# Patient Record
Sex: MALE | Race: WHITE | NOT HISPANIC OR LATINO | URBAN - METROPOLITAN AREA
[De-identification: names, ages, dates, MRNs, and addresses within clinical notes are randomized per-mention and may not be internally consistent; named-entity substitution may affect disease eponyms.]

---

## 2017-09-27 ENCOUNTER — EMERGENCY (EMERGENCY)
Facility: HOSPITAL | Age: 62
LOS: 1 days | Discharge: HOME | End: 2017-09-27
Admitting: EMERGENCY MEDICINE
Payer: MEDICARE

## 2017-09-27 VITALS
RESPIRATION RATE: 20 BRPM | HEART RATE: 108 BPM | WEIGHT: 169.98 LBS | SYSTOLIC BLOOD PRESSURE: 148 MMHG | DIASTOLIC BLOOD PRESSURE: 98 MMHG | OXYGEN SATURATION: 98 % | TEMPERATURE: 98 F

## 2017-09-27 DIAGNOSIS — Z79.899 OTHER LONG TERM (CURRENT) DRUG THERAPY: ICD-10-CM

## 2017-09-27 DIAGNOSIS — Z79.82 LONG TERM (CURRENT) USE OF ASPIRIN: ICD-10-CM
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
## 2017-09-27 DIAGNOSIS — S00.03XA CONTUSION OF SCALP, INITIAL ENCOUNTER: ICD-10-CM

## 2017-09-27 DIAGNOSIS — I25.10 ATHEROSCLEROTIC HEART DISEASE OF NATIVE CORONARY ARTERY WITHOUT ANGINA PECTORIS: ICD-10-CM
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
## 2017-09-27 DIAGNOSIS — I11.0 HYPERTENSIVE HEART DISEASE WITH HEART FAILURE: ICD-10-CM

## 2017-09-27 DIAGNOSIS — I50.9 HEART FAILURE, UNSPECIFIED: ICD-10-CM

## 2017-09-27 PROCEDURE — 70450 CT HEAD/BRAIN W/O DYE: CPT

## 2017-09-27 PROCEDURE — 93010 ELECTROCARDIOGRAM REPORT: CPT

## 2017-09-27 PROCEDURE — 71020: CPT | Mod: 26

## 2017-09-27 PROCEDURE — 70450 CT HEAD/BRAIN W/O DYE: CPT | Mod: 26

## 2017-09-27 PROCEDURE — 93005 ELECTROCARDIOGRAM TRACING: CPT

## 2017-09-27 PROCEDURE — 71046 X-RAY EXAM CHEST 2 VIEWS: CPT

## 2017-09-27 PROCEDURE — 99284 EMERGENCY DEPT VISIT MOD MDM: CPT | Mod: 25

## 2017-09-27 RX ORDER — ACETAMINOPHEN 500 MG
1000 TABLET ORAL ONCE
Qty: 0 | Refills: 0 | Status: COMPLETED | OUTPATIENT
Start: 2017-09-27 | End: 2017-09-27

## 2017-09-27 RX ADMIN — Medication 1000 MILLIGRAM(S): at 03:24

## 2017-09-27 NOTE — ED PROVIDER NOTE - CARDIAC, MLM
Normal rate, regular rhythm.  Heart sounds S1, S2.  No murmurs, rubs or gallops. Palpable PPM noted left mid axillary line
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
## 2017-09-27 NOTE — ED ADULT NURSE NOTE - PMH
CAD (coronary artery disease)    CHF (congestive heart failure)    GERD (gastroesophageal reflux disease)    HTN (hypertension)

## 2017-09-27 NOTE — ED PROVIDER NOTE - CARE PLAN
Principal Discharge DX:	Closed head injury, initial encounter  Secondary Diagnosis:	CHF (congestive heart failure)  Secondary Diagnosis:	CAD (coronary artery disease)

## 2017-09-27 NOTE — ED PROVIDER NOTE - PHYSICAL EXAMINATION
small closed tender head injury/ hematoma left side occiput no bleeding   no other overt skin injuries appreciated + MS 5/5 gait steady MS 5/5
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
## 2017-10-03 ENCOUNTER — EMERGENCY (EMERGENCY)
Facility: HOSPITAL | Age: 62
LOS: 1 days | Discharge: HOME | End: 2017-10-03
Attending: EMERGENCY MEDICINE | Admitting: EMERGENCY MEDICINE
Payer: MEDICARE

## 2017-10-03 VITALS
SYSTOLIC BLOOD PRESSURE: 130 MMHG | TEMPERATURE: 98 F | OXYGEN SATURATION: 98 % | RESPIRATION RATE: 18 BRPM | DIASTOLIC BLOOD PRESSURE: 83 MMHG | WEIGHT: 169.98 LBS | HEART RATE: 97 BPM

## 2017-10-03 VITALS
DIASTOLIC BLOOD PRESSURE: 75 MMHG | RESPIRATION RATE: 18 BRPM | OXYGEN SATURATION: 98 % | HEART RATE: 89 BPM | TEMPERATURE: 98 F | SYSTOLIC BLOOD PRESSURE: 130 MMHG

## 2017-10-03 DIAGNOSIS — F17.200 NICOTINE DEPENDENCE, UNSPECIFIED, UNCOMPLICATED: ICD-10-CM

## 2017-10-03 DIAGNOSIS — R41.82 ALTERED MENTAL STATUS, UNSPECIFIED: ICD-10-CM

## 2017-10-03 DIAGNOSIS — I11.0 HYPERTENSIVE HEART DISEASE WITH HEART FAILURE: ICD-10-CM

## 2017-10-03 DIAGNOSIS — Z79.82 LONG TERM (CURRENT) USE OF ASPIRIN: ICD-10-CM

## 2017-10-03 DIAGNOSIS — I25.10 ATHEROSCLEROTIC HEART DISEASE OF NATIVE CORONARY ARTERY WITHOUT ANGINA PECTORIS: ICD-10-CM

## 2017-10-03 DIAGNOSIS — Z79.899 OTHER LONG TERM (CURRENT) DRUG THERAPY: ICD-10-CM

## 2017-10-03 DIAGNOSIS — I50.9 HEART FAILURE, UNSPECIFIED: ICD-10-CM

## 2017-10-03 DIAGNOSIS — R51 HEADACHE: ICD-10-CM

## 2017-10-03 LAB
ALBUMIN SERPL ELPH-MCNC: 3.8 G/DL — SIGNIFICANT CHANGE UP (ref 3.3–5)
ALP SERPL-CCNC: 61 U/L — SIGNIFICANT CHANGE UP (ref 40–120)
ALT FLD-CCNC: 40 U/L — SIGNIFICANT CHANGE UP (ref 10–45)
AMPHET UR-MCNC: NEGATIVE — SIGNIFICANT CHANGE UP
ANION GAP SERPL CALC-SCNC: 12 MMOL/L — SIGNIFICANT CHANGE UP (ref 5–17)
ANION GAP SERPL CALC-SCNC: 17 MMOL/L — SIGNIFICANT CHANGE UP (ref 5–17)
APAP SERPL-MCNC: <15 UG/ML — SIGNIFICANT CHANGE UP (ref 10–30)
APPEARANCE UR: CLEAR — SIGNIFICANT CHANGE UP
AST SERPL-CCNC: 40 U/L — SIGNIFICANT CHANGE UP (ref 10–40)
BACTERIA # UR AUTO: PRESENT /HPF
BARBITURATES UR SCN-MCNC: NEGATIVE — SIGNIFICANT CHANGE UP
BASOPHILS NFR BLD AUTO: 0.3 % — SIGNIFICANT CHANGE UP (ref 0–2)
BENZODIAZ UR-MCNC: NEGATIVE — SIGNIFICANT CHANGE UP
BILIRUB SERPL-MCNC: 1.7 MG/DL — HIGH (ref 0.2–1.2)
BILIRUB UR-MCNC: NEGATIVE — SIGNIFICANT CHANGE UP
BUN SERPL-MCNC: 19 MG/DL — SIGNIFICANT CHANGE UP (ref 7–23)
BUN SERPL-MCNC: 20 MG/DL — SIGNIFICANT CHANGE UP (ref 7–23)
CALCIUM SERPL-MCNC: 8.4 MG/DL — SIGNIFICANT CHANGE UP (ref 8.4–10.5)
CALCIUM SERPL-MCNC: 8.8 MG/DL — SIGNIFICANT CHANGE UP (ref 8.4–10.5)
CHLORIDE SERPL-SCNC: 88 MMOL/L — LOW (ref 96–108)
CHLORIDE SERPL-SCNC: 90 MMOL/L — LOW (ref 96–108)
CK MB CFR SERPL CALC: 17.9 NG/ML — HIGH (ref 0–6.7)
CK SERPL-CCNC: 575 U/L — HIGH (ref 30–200)
CO2 SERPL-SCNC: 22 MMOL/L — SIGNIFICANT CHANGE UP (ref 22–31)
CO2 SERPL-SCNC: 25 MMOL/L — SIGNIFICANT CHANGE UP (ref 22–31)
COCAINE METAB.OTHER UR-MCNC: NEGATIVE — SIGNIFICANT CHANGE UP
COLOR SPEC: YELLOW — SIGNIFICANT CHANGE UP
CREAT SERPL-MCNC: 0.97 MG/DL — SIGNIFICANT CHANGE UP (ref 0.5–1.3)
CREAT SERPL-MCNC: 0.98 MG/DL — SIGNIFICANT CHANGE UP (ref 0.5–1.3)
DIFF PNL FLD: (no result)
EOSINOPHIL NFR BLD AUTO: 0.6 % — SIGNIFICANT CHANGE UP (ref 0–6)
EPI CELLS # UR: SIGNIFICANT CHANGE UP /HPF
ETHANOL SERPL-MCNC: <10 MG/DL — SIGNIFICANT CHANGE UP (ref 0–10)
GLUCOSE SERPL-MCNC: 98 MG/DL — SIGNIFICANT CHANGE UP (ref 70–99)
GLUCOSE SERPL-MCNC: 99 MG/DL — SIGNIFICANT CHANGE UP (ref 70–99)
GLUCOSE UR QL: NEGATIVE — SIGNIFICANT CHANGE UP
HCT VFR BLD CALC: 33.6 % — LOW (ref 39–50)
HGB BLD-MCNC: 12.1 G/DL — LOW (ref 13–17)
KETONES UR-MCNC: (no result) MG/DL
LEUKOCYTE ESTERASE UR-ACNC: NEGATIVE — SIGNIFICANT CHANGE UP
LYMPHOCYTES # BLD AUTO: 11.2 % — LOW (ref 13–44)
MCHC RBC-ENTMCNC: 33.7 PG — SIGNIFICANT CHANGE UP (ref 27–34)
MCHC RBC-ENTMCNC: 36 G/DL — SIGNIFICANT CHANGE UP (ref 32–36)
MCV RBC AUTO: 93.6 FL — SIGNIFICANT CHANGE UP (ref 80–100)
METHADONE UR-MCNC: NEGATIVE — SIGNIFICANT CHANGE UP
MONOCYTES NFR BLD AUTO: 6.8 % — SIGNIFICANT CHANGE UP (ref 2–14)
NEUTROPHILS NFR BLD AUTO: 81.1 % — HIGH (ref 43–77)
NITRITE UR-MCNC: NEGATIVE — SIGNIFICANT CHANGE UP
NT-PROBNP SERPL-SCNC: 4935 PG/ML — HIGH (ref 0–300)
OPIATES UR-MCNC: NEGATIVE — SIGNIFICANT CHANGE UP
PCP SPEC-MCNC: SIGNIFICANT CHANGE UP
PCP UR-MCNC: NEGATIVE — SIGNIFICANT CHANGE UP
PH UR: 7 — SIGNIFICANT CHANGE UP (ref 5–8)
PLATELET # BLD AUTO: 173 K/UL — SIGNIFICANT CHANGE UP (ref 150–400)
POTASSIUM SERPL-MCNC: 3.8 MMOL/L — SIGNIFICANT CHANGE UP (ref 3.5–5.3)
POTASSIUM SERPL-MCNC: 4 MMOL/L — SIGNIFICANT CHANGE UP (ref 3.5–5.3)
POTASSIUM SERPL-SCNC: 3.8 MMOL/L — SIGNIFICANT CHANGE UP (ref 3.5–5.3)
POTASSIUM SERPL-SCNC: 4 MMOL/L — SIGNIFICANT CHANGE UP (ref 3.5–5.3)
PROT SERPL-MCNC: 5.8 G/DL — LOW (ref 6–8.3)
PROT UR-MCNC: NEGATIVE MG/DL — SIGNIFICANT CHANGE UP
RBC # BLD: 3.59 M/UL — LOW (ref 4.2–5.8)
RBC # FLD: 14.3 % — SIGNIFICANT CHANGE UP (ref 10.3–16.9)
RBC CASTS # UR COMP ASSIST: < 5 /HPF — SIGNIFICANT CHANGE UP
SALICYLATES SERPL-MCNC: <0.3 MG/DL — LOW (ref 2.8–20)
SODIUM SERPL-SCNC: 127 MMOL/L — LOW (ref 135–145)
SODIUM SERPL-SCNC: 127 MMOL/L — LOW (ref 135–145)
SP GR SPEC: <=1.005 — SIGNIFICANT CHANGE UP (ref 1–1.03)
THC UR QL: NEGATIVE — SIGNIFICANT CHANGE UP
TROPONIN T SERPL-MCNC: <0.01 NG/ML — SIGNIFICANT CHANGE UP (ref 0–0.01)
UROBILINOGEN FLD QL: 0.2 E.U./DL — SIGNIFICANT CHANGE UP
WBC # BLD: 6.9 K/UL — SIGNIFICANT CHANGE UP (ref 3.8–10.5)
WBC # FLD AUTO: 6.9 K/UL — SIGNIFICANT CHANGE UP (ref 3.8–10.5)
WBC UR QL: < 5 /HPF — SIGNIFICANT CHANGE UP

## 2017-10-03 PROCEDURE — 93010 ELECTROCARDIOGRAM REPORT: CPT

## 2017-10-03 PROCEDURE — 99285 EMERGENCY DEPT VISIT HI MDM: CPT | Mod: 25

## 2017-10-03 PROCEDURE — 71020: CPT | Mod: 26

## 2017-10-03 PROCEDURE — 70450 CT HEAD/BRAIN W/O DYE: CPT | Mod: 26

## 2017-10-03 RX ORDER — ACETAMINOPHEN 500 MG
650 TABLET ORAL ONCE
Qty: 0 | Refills: 0 | Status: COMPLETED | OUTPATIENT
Start: 2017-10-03 | End: 2017-10-03

## 2017-10-03 RX ORDER — FUROSEMIDE 40 MG
40 TABLET ORAL ONCE
Qty: 0 | Refills: 0 | Status: COMPLETED | OUTPATIENT
Start: 2017-10-03 | End: 2017-10-03

## 2017-10-03 RX ORDER — ASPIRIN/CALCIUM CARB/MAGNESIUM 324 MG
325 TABLET ORAL ONCE
Qty: 0 | Refills: 0 | Status: COMPLETED | OUTPATIENT
Start: 2017-10-03 | End: 2017-10-03

## 2017-10-03 RX ADMIN — Medication 40 MILLIGRAM(S): at 04:56

## 2017-10-03 RX ADMIN — Medication 650 MILLIGRAM(S): at 04:56

## 2017-10-03 RX ADMIN — Medication 325 MILLIGRAM(S): at 04:56

## 2017-10-03 NOTE — ED PROVIDER NOTE - MEDICAL DECISION MAKING DETAILS
62 yo male found unresponsive vs sleeping on the floor - h/o substance abuse but denies drug/etoh, ? substance related - will check tox labs.  Pt c/o shaking episodes w/o loc - doubt sz, no fever to suggest shaking chill.  Will check ct head, labs.  Pt also notes le edema, crystal similar to baseline since mi per pt, orthopnea despite lasix 20 mg po x 3 d.  ? chf exacerbation.  Denies cp.  EKG w/o stemi.  Will check cardiac labs, cxr, give lasix, asa.

## 2017-10-03 NOTE — ED PROVIDER NOTE - DIAGNOSTIC INTERPRETATION
ER Physician:  Shelly Director  CHEST XRAY INTERPRETATION: lungs clear, heart shadow normal, bony structures intact , aicd L chest wall
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
## 2017-10-03 NOTE — ED PROVIDER NOTE - PROGRESS NOTE DETAILS
Vrad read of head ct neg for acute process.  CXR w/o sig pulm vasc congestion, bnp elevated, trop neg.  Pt ambulatory in ed w/o sx or complaint.  Will dc, recommend increasing lasix to 40 qday x several days and fu w his cardiologist and pmd.
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
## 2017-10-03 NOTE — ED PROVIDER NOTE - MUSCULOSKELETAL, MLM
Spine appears normal, range of motion is not limited, no muscle or joint tenderness, 2+ le edema w/o erythema, warmth, ttp, no c/c

## 2017-10-04 ENCOUNTER — EMERGENCY (EMERGENCY)
Facility: HOSPITAL | Age: 62
LOS: 1 days | Discharge: HOME | End: 2017-10-04
Attending: EMERGENCY MEDICINE | Admitting: EMERGENCY MEDICINE
Payer: MEDICARE

## 2017-10-04 VITALS
OXYGEN SATURATION: 97 % | SYSTOLIC BLOOD PRESSURE: 122 MMHG | RESPIRATION RATE: 15 BRPM | HEART RATE: 90 BPM | DIASTOLIC BLOOD PRESSURE: 76 MMHG

## 2017-10-04 DIAGNOSIS — I50.9 HEART FAILURE, UNSPECIFIED: ICD-10-CM

## 2017-10-04 DIAGNOSIS — R41.82 ALTERED MENTAL STATUS, UNSPECIFIED: ICD-10-CM

## 2017-10-04 DIAGNOSIS — E87.1 HYPO-OSMOLALITY AND HYPONATREMIA: ICD-10-CM

## 2017-10-04 DIAGNOSIS — I11.0 HYPERTENSIVE HEART DISEASE WITH HEART FAILURE: ICD-10-CM

## 2017-10-04 DIAGNOSIS — R55 SYNCOPE AND COLLAPSE: ICD-10-CM

## 2017-10-04 DIAGNOSIS — I25.10 ATHEROSCLEROTIC HEART DISEASE OF NATIVE CORONARY ARTERY WITHOUT ANGINA PECTORIS: ICD-10-CM

## 2017-10-04 DIAGNOSIS — Z79.899 OTHER LONG TERM (CURRENT) DRUG THERAPY: ICD-10-CM

## 2017-10-04 DIAGNOSIS — Z79.82 LONG TERM (CURRENT) USE OF ASPIRIN: ICD-10-CM

## 2017-10-04 LAB
ALBUMIN SERPL ELPH-MCNC: 3.5 G/DL — SIGNIFICANT CHANGE UP (ref 3.3–5)
ALP SERPL-CCNC: 57 U/L — SIGNIFICANT CHANGE UP (ref 40–120)
ALT FLD-CCNC: 79 U/L — HIGH (ref 10–45)
ANION GAP SERPL CALC-SCNC: 20 MMOL/L — HIGH (ref 5–17)
APPEARANCE UR: CLEAR — SIGNIFICANT CHANGE UP
APTT BLD: 25.1 SEC — LOW (ref 27.5–37.4)
AST SERPL-CCNC: 182 U/L — HIGH (ref 10–40)
BASE EXCESS BLDV CALC-SCNC: 1.4 MMOL/L — SIGNIFICANT CHANGE UP
BASOPHILS NFR BLD AUTO: 0.4 % — SIGNIFICANT CHANGE UP (ref 0–2)
BILIRUB SERPL-MCNC: 1.7 MG/DL — HIGH (ref 0.2–1.2)
BILIRUB UR-MCNC: NEGATIVE — SIGNIFICANT CHANGE UP
BUN SERPL-MCNC: 15 MG/DL — SIGNIFICANT CHANGE UP (ref 7–23)
CALCIUM SERPL-MCNC: 8.6 MG/DL — SIGNIFICANT CHANGE UP (ref 8.4–10.5)
CHLORIDE SERPL-SCNC: 84 MMOL/L — LOW (ref 96–108)
CK SERPL-CCNC: 4197 U/L — HIGH (ref 30–200)
CO2 SERPL-SCNC: 20 MMOL/L — LOW (ref 22–31)
COLOR SPEC: YELLOW — SIGNIFICANT CHANGE UP
CREAT SERPL-MCNC: 0.95 MG/DL — SIGNIFICANT CHANGE UP (ref 0.5–1.3)
DIFF PNL FLD: (no result)
EOSINOPHIL NFR BLD AUTO: 0.8 % — SIGNIFICANT CHANGE UP (ref 0–6)
ETHANOL SERPL-MCNC: <10 MG/DL — SIGNIFICANT CHANGE UP (ref 0–10)
GAS PNL BLDV: SIGNIFICANT CHANGE UP
GLUCOSE SERPL-MCNC: 78 MG/DL — SIGNIFICANT CHANGE UP (ref 70–99)
GLUCOSE UR QL: NEGATIVE — SIGNIFICANT CHANGE UP
HCO3 BLDV-SCNC: 27 MMOL/L — SIGNIFICANT CHANGE UP (ref 20–27)
HCT VFR BLD CALC: 31.9 % — LOW (ref 39–50)
HGB BLD-MCNC: 11.6 G/DL — LOW (ref 13–17)
INR BLD: 1.15 — SIGNIFICANT CHANGE UP (ref 0.88–1.16)
KETONES UR-MCNC: 15 MG/DL
LACTATE SERPL-SCNC: 5.4 MMOL/L — CRITICAL HIGH (ref 0.5–2)
LEUKOCYTE ESTERASE UR-ACNC: NEGATIVE — SIGNIFICANT CHANGE UP
LYMPHOCYTES # BLD AUTO: 12.9 % — LOW (ref 13–44)
MCHC RBC-ENTMCNC: 33.8 PG — SIGNIFICANT CHANGE UP (ref 27–34)
MCHC RBC-ENTMCNC: 36.4 G/DL — HIGH (ref 32–36)
MCV RBC AUTO: 93 FL — SIGNIFICANT CHANGE UP (ref 80–100)
MONOCYTES NFR BLD AUTO: 4.4 % — SIGNIFICANT CHANGE UP (ref 2–14)
NEUTROPHILS NFR BLD AUTO: 81.5 % — HIGH (ref 43–77)
NITRITE UR-MCNC: NEGATIVE — SIGNIFICANT CHANGE UP
PCO2 BLDV: 46 MMHG — SIGNIFICANT CHANGE UP (ref 41–51)
PH BLDV: 7.38 — SIGNIFICANT CHANGE UP (ref 7.32–7.43)
PH UR: 6.5 — SIGNIFICANT CHANGE UP (ref 5–8)
PLATELET # BLD AUTO: 172 K/UL — SIGNIFICANT CHANGE UP (ref 150–400)
PO2 BLDV: 18 MMHG — SIGNIFICANT CHANGE UP
POTASSIUM SERPL-MCNC: 3.7 MMOL/L — SIGNIFICANT CHANGE UP (ref 3.5–5.3)
POTASSIUM SERPL-SCNC: 3.7 MMOL/L — SIGNIFICANT CHANGE UP (ref 3.5–5.3)
PROT SERPL-MCNC: 5.6 G/DL — LOW (ref 6–8.3)
PROT UR-MCNC: (no result) MG/DL
PROTHROM AB SERPL-ACNC: 12.8 SEC — HIGH (ref 9.8–12.7)
RBC # BLD: 3.43 M/UL — LOW (ref 4.2–5.8)
RBC # FLD: 13.9 % — SIGNIFICANT CHANGE UP (ref 10.3–16.9)
SAO2 % BLDV: 27 % — SIGNIFICANT CHANGE UP
SODIUM SERPL-SCNC: 124 MMOL/L — LOW (ref 135–145)
SP GR SPEC: 1.01 — SIGNIFICANT CHANGE UP (ref 1–1.03)
UROBILINOGEN FLD QL: 1 E.U./DL — SIGNIFICANT CHANGE UP
WBC # BLD: 5 K/UL — SIGNIFICANT CHANGE UP (ref 3.8–10.5)
WBC # FLD AUTO: 5 K/UL — SIGNIFICANT CHANGE UP (ref 3.8–10.5)

## 2017-10-04 PROCEDURE — 70450 CT HEAD/BRAIN W/O DYE: CPT | Mod: 26

## 2017-10-04 PROCEDURE — 71010: CPT | Mod: 26

## 2017-10-04 PROCEDURE — 93010 ELECTROCARDIOGRAM REPORT: CPT

## 2017-10-04 PROCEDURE — 99285 EMERGENCY DEPT VISIT HI MDM: CPT | Mod: 25

## 2017-10-04 RX ORDER — SODIUM CHLORIDE 9 MG/ML
1000 INJECTION INTRAMUSCULAR; INTRAVENOUS; SUBCUTANEOUS
Qty: 0 | Refills: 0 | Status: DISCONTINUED | OUTPATIENT
Start: 2017-10-04 | End: 2017-10-08

## 2017-10-04 RX ADMIN — SODIUM CHLORIDE 250 MILLILITER(S): 9 INJECTION INTRAMUSCULAR; INTRAVENOUS; SUBCUTANEOUS at 11:45
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
## 2017-10-04 NOTE — ED ADULT NURSE REASSESSMENT NOTE - NS ED NURSE REASSESS COMMENT FT1
MD Miles spoke with pt regarding admission, pt amenable to staying. RN rounded on pt, pt states "I'm not staying I'm leaving." MD Miles at bedside, pt argumentative, states "I've been in too many hospitals too many times I don't want to stay." Pt's bilateral IV removed, pt in street clothing. MD Miles aware. Will continue to monitor.

## 2017-10-04 NOTE — ED ADULT NURSE REASSESSMENT NOTE - NS ED NURSE REASSESS COMMENT FT1
Pt endorsed to me from ESTEFANÍA Salcedo for AMS, pt alert and awake, oriented x 3 at this time, receiving IVF, and pending reassessment. Will continue to monitor.

## 2017-10-04 NOTE — ED ADULT TRIAGE NOTE - OTHER COMPLAINTS
On arrival patient is oriented to person, place and year on arousal. Patient denies hx of seizures. Patient restless on arrival. IV#20g to right antecubital inserted by EMS.

## 2017-10-04 NOTE — ED PROVIDER NOTE - MEDICAL DECISION MAKING DETAILS
pt found altered in street, syncope vs seizure. Unknown drug use. Pt seen yesterday in ED for similar. Concern for symptomatic hyponatremia. Will continue to monitor, start NS at 250/hr. Recheck sodium. Likely admission. Low concern for infection given no fever, no elevation in WBC count. Elevated lactate likely from drug use, dehydration, liver dysfunction. pt found altered in street, syncope vs seizure. Unknown drug use. Pt seen yesterday in ED for similar. Concern for symptomatic hyponatremia. Will continue to monitor, start NS at 250/hr. Recheck sodium. Likely admission. Low concern for infection given no fever, no elevation in WBC count. Elevated lactate likely from drug use, dehydration, liver dysfunction.    Pt woke up in ED, completely alert and oriented with appropriate insight. I explained to patient that he would need to stay to be evaluated for the hyponatremia, that possibly his symptoms could have been related to the low sodium. Pt states that he does not want to be hospitalized. He was able to verbally repeat that he understands his risk of death or permanent disability if he were to leave without his sodium being corrected. Pt did not want to signs papers.

## 2017-10-05 LAB
CULTURE RESULTS: NO GROWTH — SIGNIFICANT CHANGE UP
SPECIMEN SOURCE: SIGNIFICANT CHANGE UP

## 2017-10-06 ENCOUNTER — INPATIENT (INPATIENT)
Facility: HOSPITAL | Age: 62
LOS: 0 days | Discharge: HOME | DRG: 92 | End: 2017-10-07
Attending: INTERNAL MEDICINE | Admitting: INTERNAL MEDICINE
Payer: COMMERCIAL

## 2017-10-06 VITALS
DIASTOLIC BLOOD PRESSURE: 78 MMHG | SYSTOLIC BLOOD PRESSURE: 148 MMHG | TEMPERATURE: 98 F | HEART RATE: 102 BPM | RESPIRATION RATE: 20 BRPM | OXYGEN SATURATION: 98 %

## 2017-10-06 LAB
ALBUMIN SERPL ELPH-MCNC: 3.7 G/DL — SIGNIFICANT CHANGE UP (ref 3.3–5)
ALP SERPL-CCNC: 60 U/L — SIGNIFICANT CHANGE UP (ref 40–120)
ALT FLD-CCNC: 102 U/L — HIGH (ref 10–45)
AMPHET UR-MCNC: NEGATIVE — SIGNIFICANT CHANGE UP
ANION GAP SERPL CALC-SCNC: 14 MMOL/L — SIGNIFICANT CHANGE UP (ref 5–17)
APPEARANCE UR: CLEAR — SIGNIFICANT CHANGE UP
AST SERPL-CCNC: 171 U/L — HIGH (ref 10–40)
BACTERIA # UR AUTO: PRESENT /HPF
BARBITURATES UR SCN-MCNC: NEGATIVE — SIGNIFICANT CHANGE UP
BASOPHILS NFR BLD AUTO: 0.4 % — SIGNIFICANT CHANGE UP (ref 0–2)
BENZODIAZ UR-MCNC: NEGATIVE — SIGNIFICANT CHANGE UP
BILIRUB SERPL-MCNC: 1.2 MG/DL — SIGNIFICANT CHANGE UP (ref 0.2–1.2)
BILIRUB UR-MCNC: NEGATIVE — SIGNIFICANT CHANGE UP
BUN SERPL-MCNC: 16 MG/DL — SIGNIFICANT CHANGE UP (ref 7–23)
CALCIUM SERPL-MCNC: 8.7 MG/DL — SIGNIFICANT CHANGE UP (ref 8.4–10.5)
CHLORIDE SERPL-SCNC: 92 MMOL/L — LOW (ref 96–108)
CK SERPL-CCNC: 3110 U/L — HIGH (ref 30–200)
CO2 SERPL-SCNC: 24 MMOL/L — SIGNIFICANT CHANGE UP (ref 22–31)
COCAINE METAB.OTHER UR-MCNC: POSITIVE
COLOR SPEC: YELLOW — SIGNIFICANT CHANGE UP
CREAT SERPL-MCNC: 0.89 MG/DL — SIGNIFICANT CHANGE UP (ref 0.5–1.3)
DIFF PNL FLD: (no result)
EOSINOPHIL NFR BLD AUTO: 0.4 % — SIGNIFICANT CHANGE UP (ref 0–6)
EPI CELLS # UR: SIGNIFICANT CHANGE UP /HPF
ETHANOL SERPL-MCNC: <10 MG/DL — SIGNIFICANT CHANGE UP (ref 0–10)
GLUCOSE SERPL-MCNC: 89 MG/DL — SIGNIFICANT CHANGE UP (ref 70–99)
GLUCOSE UR QL: NEGATIVE — SIGNIFICANT CHANGE UP
HCT VFR BLD CALC: 32.2 % — LOW (ref 39–50)
HGB BLD-MCNC: 10.9 G/DL — LOW (ref 13–17)
KETONES UR-MCNC: NEGATIVE — SIGNIFICANT CHANGE UP
LEUKOCYTE ESTERASE UR-ACNC: NEGATIVE — SIGNIFICANT CHANGE UP
LYMPHOCYTES # BLD AUTO: 13.3 % — SIGNIFICANT CHANGE UP (ref 13–44)
MCHC RBC-ENTMCNC: 32.7 PG — SIGNIFICANT CHANGE UP (ref 27–34)
MCHC RBC-ENTMCNC: 33.9 G/DL — SIGNIFICANT CHANGE UP (ref 32–36)
MCV RBC AUTO: 96.7 FL — SIGNIFICANT CHANGE UP (ref 80–100)
METHADONE UR-MCNC: NEGATIVE — SIGNIFICANT CHANGE UP
MONOCYTES NFR BLD AUTO: 7.6 % — SIGNIFICANT CHANGE UP (ref 2–14)
NEUTROPHILS NFR BLD AUTO: 78.3 % — HIGH (ref 43–77)
NITRITE UR-MCNC: NEGATIVE — SIGNIFICANT CHANGE UP
OPIATES UR-MCNC: NEGATIVE — SIGNIFICANT CHANGE UP
PCP SPEC-MCNC: SIGNIFICANT CHANGE UP
PCP UR-MCNC: NEGATIVE — SIGNIFICANT CHANGE UP
PH UR: 7 — SIGNIFICANT CHANGE UP (ref 5–8)
PLATELET # BLD AUTO: 160 K/UL — SIGNIFICANT CHANGE UP (ref 150–400)
POTASSIUM SERPL-MCNC: 3.8 MMOL/L — SIGNIFICANT CHANGE UP (ref 3.5–5.3)
POTASSIUM SERPL-SCNC: 3.8 MMOL/L — SIGNIFICANT CHANGE UP (ref 3.5–5.3)
PROT SERPL-MCNC: 5.4 G/DL — LOW (ref 6–8.3)
PROT UR-MCNC: (no result) MG/DL
RBC # BLD: 3.33 M/UL — LOW (ref 4.2–5.8)
RBC # FLD: 13.9 % — SIGNIFICANT CHANGE UP (ref 10.3–16.9)
RBC CASTS # UR COMP ASSIST: < 5 /HPF — SIGNIFICANT CHANGE UP
SODIUM SERPL-SCNC: 130 MMOL/L — LOW (ref 135–145)
SP GR SPEC: 1.01 — SIGNIFICANT CHANGE UP (ref 1–1.03)
THC UR QL: NEGATIVE — SIGNIFICANT CHANGE UP
UROBILINOGEN FLD QL: 4 E.U./DL
WBC # BLD: 4.7 K/UL — SIGNIFICANT CHANGE UP (ref 3.8–10.5)
WBC # FLD AUTO: 4.7 K/UL — SIGNIFICANT CHANGE UP (ref 3.8–10.5)
WBC UR QL: < 5 /HPF — SIGNIFICANT CHANGE UP

## 2017-10-06 PROCEDURE — 70450 CT HEAD/BRAIN W/O DYE: CPT | Mod: 26

## 2017-10-06 PROCEDURE — 99285 EMERGENCY DEPT VISIT HI MDM: CPT | Mod: 25

## 2017-10-06 PROCEDURE — 93010 ELECTROCARDIOGRAM REPORT: CPT

## 2017-10-06 PROCEDURE — 71010: CPT | Mod: 26

## 2017-10-06 PROCEDURE — 99223 1ST HOSP IP/OBS HIGH 75: CPT | Mod: GC

## 2017-10-06 RX ORDER — SACUBITRIL AND VALSARTAN 24; 26 MG/1; MG/1
1 TABLET, FILM COATED ORAL
Qty: 0 | Refills: 0 | Status: DISCONTINUED | OUTPATIENT
Start: 2017-10-06 | End: 2017-10-07

## 2017-10-06 RX ORDER — SODIUM CHLORIDE 9 MG/ML
1000 INJECTION INTRAMUSCULAR; INTRAVENOUS; SUBCUTANEOUS ONCE
Qty: 0 | Refills: 0 | Status: COMPLETED | OUTPATIENT
Start: 2017-10-06 | End: 2017-10-06

## 2017-10-06 RX ORDER — HEPARIN SODIUM 5000 [USP'U]/ML
5000 INJECTION INTRAVENOUS; SUBCUTANEOUS EVERY 8 HOURS
Qty: 0 | Refills: 0 | Status: DISCONTINUED | OUTPATIENT
Start: 2017-10-06 | End: 2017-10-07

## 2017-10-06 RX ORDER — ASPIRIN/CALCIUM CARB/MAGNESIUM 324 MG
81 TABLET ORAL DAILY
Qty: 0 | Refills: 0 | Status: DISCONTINUED | OUTPATIENT
Start: 2017-10-06 | End: 2017-10-07

## 2017-10-06 RX ORDER — SPIRONOLACTONE 25 MG/1
25 TABLET, FILM COATED ORAL DAILY
Qty: 0 | Refills: 0 | Status: DISCONTINUED | OUTPATIENT
Start: 2017-10-06 | End: 2017-10-07

## 2017-10-06 RX ORDER — ATORVASTATIN CALCIUM 80 MG/1
80 TABLET, FILM COATED ORAL AT BEDTIME
Qty: 0 | Refills: 0 | Status: DISCONTINUED | OUTPATIENT
Start: 2017-10-06 | End: 2017-10-07

## 2017-10-06 RX ORDER — PRASUGREL 5 MG/1
10 TABLET, FILM COATED ORAL DAILY
Qty: 0 | Refills: 0 | Status: DISCONTINUED | OUTPATIENT
Start: 2017-10-06 | End: 2017-10-07

## 2017-10-06 RX ADMIN — SODIUM CHLORIDE 1000 MILLILITER(S): 9 INJECTION INTRAMUSCULAR; INTRAVENOUS; SUBCUTANEOUS at 22:05

## 2017-10-06 RX ADMIN — SODIUM CHLORIDE 1000 MILLILITER(S): 9 INJECTION INTRAMUSCULAR; INTRAVENOUS; SUBCUTANEOUS at 20:05

## 2017-10-06 NOTE — H&P ADULT - ATTENDING COMMENTS
pt seen and examined; reviewed vs, labs, ekg  pt a/f AMS  pt is lethargic ; minimally cooperative w/ examination ; PE findings as above in addition pt w/ ICD at the left lower chest regtion; there are abrasions at lower ext b/l, tender at the right lower ext   a/p:   1. AMS: unclear etiology appears drug induced; monitor for withdrawl sxs   2. elevated CK: s/p 3L fluid boluses; monitor fluid status  3. cocaine abuse: to advise cessation when less lethargic  4. hyponatremia: monitor BMP ; follow up serum osm, urine studies  5. dysuria: plan as above   6. monitor LFTs  7. CHF: monitor fluid status, c/w home medications
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
## 2017-10-06 NOTE — ED PROVIDER NOTE - MEDICAL DECISION MAKING DETAILS
cocaine abuse-- rhabdo -- AMS - ? etoh abuse - sleepy but arousable  no ekg changes or cp  no n/V  await CT head
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
## 2017-10-06 NOTE — H&P ADULT - PROBLEM SELECTOR PLAN 6
Patient has LE edema as well as some vascular congestion on CXR. Do not feel that the patient is in decompensated heart failure at the current time.   - Continue home CHF meds: Entresto, spironolactone, Coreg  - Patient only takes Lasix 3 times a week, will hold for now Patient has LE edema as well as some vascular congestion on CXR. Last echo here at [facility] showed severe hypokinesis and an EF of 20%. Do not feel that the patient is in decompensated heart failure at the current time.   - Continue home CHF meds: Entresto, spironolactone, Coreg  - Patient only takes Lasix 3 times a week, will hold for now Suspect that the elevation in AST and ALT are due to the patient's drug use and elevated CK.  - Trend LFTs
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
## 2017-10-06 NOTE — H&P ADULT - PROBLEM SELECTOR PLAN 4
Ua negative for UTI.   - Check urine GC/chlamydia Hypotonic hyponatremia - Na of 130, serum osm of 267.  Has been as low as 124 on past ED visits. Suspect that this is due to his CHF.   - Check urine Na, urine osm, serum osm

## 2017-10-06 NOTE — H&P ADULT - NSHPREVIEWOFSYSTEMS_GEN_ALL_CORE
+ abdominal pain, dysuria  - fever, chills, chest pain, SOB, nausea, vomiting, diarrhea, constipation, rash

## 2017-10-06 NOTE — ED PROVIDER NOTE - CARE PLAN
Principal Discharge DX:	Cocaine abuse  Secondary Diagnosis:	Rhabdomyolysis Principal Discharge DX:	Cocaine abuse  Secondary Diagnosis:	Rhabdomyolysis  Secondary Diagnosis:	Altered mental state

## 2017-10-06 NOTE — H&P ADULT - PROBLEM SELECTOR PLAN 10
F: No IVF at the current time  E: Replete electrolytes fro a K of 4 and Mg of 2  N: DASH/TLC diet  P: Heparin SubQ  C: Full Code  D: Admit to RMF, SW consult
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
## 2017-10-06 NOTE — H&P ADULT - PROBLEM SELECTOR PLAN 1
Patient likely altered 2/2 to a drug which is not detected in our tox screen. CVA unlikely based on presentation. CT head only shows microvascular white matter disease. Low suspicion for infectious cause such a meningitis based on the normal WBC count and lack of fever. Low suspicion for alcohol withdrawal as patient is not tremulous.   - Check HIV, RPR, B12, Folate, TSH, serum osm

## 2017-10-06 NOTE — ED PROVIDER NOTE - NEURO NEGATIVE STATEMENT, MLM
no loss of consciousness, no gait abnormality, no headache, no sensory deficits, and no weakness. pos occasional hallucinations

## 2017-10-06 NOTE — H&P ADULT - NSHPLABSRESULTS_GEN_ALL_CORE
.  LABS:                         10.9   4.7   )-----------( 160      ( 06 Oct 2017 19:42 )             32.2     10-    130<L>  |  92<L>  |  16  ----------------------------<  89  3.8   |  24  |  0.89    Ca    8.7      06 Oct 2017 19:42    TPro  5.4<L>  /  Alb  3.7  /  TBili  1.2  /  DBili  x   /  AST  171<H>  /  ALT  102<H>  /  AlkPhos  60  10      Urinalysis Basic - ( 06 Oct 2017 19:42 )    Color: Yellow / Appearance: Clear / S.010 / pH: x  Gluc: x / Ketone: NEGATIVE  / Bili: Negative / Urobili: 4.0 E.U./dL   Blood: x / Protein: Trace mg/dL / Nitrite: NEGATIVE   Leuk Esterase: NEGATIVE / RBC: < 5 /HPF / WBC < 5 /HPF   Sq Epi: x / Non Sq Epi: Rare /HPF / Bacteria: Present /HPF      CARDIAC MARKERS ( 06 Oct 2017 19:42 )  x     / <0.01 ng/mL / 3110 U/L / x     / 44.1 ng/mL            RADIOLOGY, EKG & ADDITIONAL TESTS: Reviewed.

## 2017-10-06 NOTE — H&P ADULT - PROBLEM SELECTOR PLAN 7
Normocytic anemia.   - Check Fe studies  - Outpatient colonoscopy Patient has LE edema as well as some vascular congestion on CXR. Last echo here at [facility] showed severe hypokinesis and an EF of 20%. Do not feel that the patient is in decompensated heart failure at the current time.   - Continue home CHF meds: Entresto, spironolactone, Coreg  - Patient only takes Lasix 3 times a week, will hold for now

## 2017-10-06 NOTE — H&P ADULT - PROBLEM SELECTOR PLAN 5
Suspect that the elevation in AST and ALT are due to the patient's drug use and elevated CK.  - Trend LFTs Ua negative for UTI.   - Check urine GC/chlamydia
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
## 2017-10-06 NOTE — ED PROVIDER NOTE - CRANIAL NERVE AND PUPILLARY EXAM
cranial nerves 2-12 intact/central vision intact/extra-ocular movements intact/corneal reflex intact/central and peripheral vision intact/cough reflex intact/gag reflex intact/tongue is midline

## 2017-10-06 NOTE — H&P ADULT - PROBLEM SELECTOR PLAN 8
Likely cause of the patient's abdominal pain.  - Restart Protonix Normocytic anemia.   - Check Fe studies  - Outpatient colonoscopy
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
## 2017-10-06 NOTE — H&P ADULT - NSHPPHYSICALEXAM_GEN_ALL_CORE
.  VITAL SIGNS:  T(C): 36.3 (10-06-17 @ 23:09), Max: 36.6 (10-06-17 @ 18:37)  T(F): 97.4 (10-06-17 @ 23:09), Max: 97.8 (10-06-17 @ 18:37)  HR: 85 (10-06-17 @ 23:09) (85 - 102)  BP: 125/77 (10-06-17 @ 23:09) (125/77 - 148/78)  BP(mean): --  RR: 18 (10-06-17 @ 23:09) (18 - 20)  SpO2: 98% (10-06-17 @ 23:09) (98% - 99%)  Wt(kg): --    PHYSICAL EXAM:    Constitutional: Somnolent, arousable to verbal stimuli  Head: NC/AT  Eyes: PERRL, clear conjunctiva  ENT: no nasal discharge; no oropharyngeal erythema or exudates; MMM  Neck: supple; no JVD  Respiratory: CTA B/L; no W/R/R, no retractions  Cardiac: +S1/S2; RRR; no M/R/G; PMI non-displaced  Gastrointestinal: soft, NT/ND; no rebound or guarding; +BSx4  Back: spine midline, no bony tenderness or step-offs; no CVAT B/L  Extremities: WWP, no clubbing or cyanosis; 2+ pitting edema B/L LE  Musculoskeletal: NROM x4; no joint swelling, tenderness or erythema  Vascular: 2+ radial, femoral, DP/PT pulses B/L  Dermatologic: skin warm, dry and intact; no rashes, wounds, or scars; +marks from adhesive from last hospital visit  Neurologic: AAOx3; CNII-XII grossly intact; no focal deficits; moves all extremities  Psychiatric: patient falls asleep during exam

## 2017-10-06 NOTE — H&P ADULT - ASSESSMENT
61M PMHx of CAD, CHF with AICD, GERD, HTN, and drug abuse BIB EMS, admitted for lethargy and altered mental status.

## 2017-10-06 NOTE — H&P ADULT - PROBLEM SELECTOR PROBLEM 8
Refill approved, Rx sent electronically to pharmacy.   Charlee Walker M.D.      GERD (gastroesophageal reflux disease) Anemia

## 2017-10-06 NOTE — H&P ADULT - PROBLEM SELECTOR PLAN 2
CK elevated to 3110 today, downtrending from last ED visit on 10/4. Despite having a CK> 5x the upper limit of normal, the patient does not have dark urine, myalgias, hyperkalemia, or FLORA, so this cannot be called rhabdomyolysis. He has received 3L NS in the ED.   - No more IV fluids, as he has received about 45cc/kg of fluid in the setting of CHF and LE swelling  - Continue to monitor BMP

## 2017-10-07 ENCOUNTER — TRANSCRIPTION ENCOUNTER (OUTPATIENT)
Age: 62
End: 2017-10-07

## 2017-10-07 VITALS — WEIGHT: 143.3 LBS

## 2017-10-07 DIAGNOSIS — E87.1 HYPO-OSMOLALITY AND HYPONATREMIA: ICD-10-CM

## 2017-10-07 DIAGNOSIS — R74.8 ABNORMAL LEVELS OF OTHER SERUM ENZYMES: ICD-10-CM

## 2017-10-07 DIAGNOSIS — K21.9 GASTRO-ESOPHAGEAL REFLUX DISEASE WITHOUT ESOPHAGITIS: ICD-10-CM

## 2017-10-07 DIAGNOSIS — R41.82 ALTERED MENTAL STATUS, UNSPECIFIED: ICD-10-CM

## 2017-10-07 DIAGNOSIS — G92 TOXIC ENCEPHALOPATHY: ICD-10-CM

## 2017-10-07 DIAGNOSIS — I50.22 CHRONIC SYSTOLIC (CONGESTIVE) HEART FAILURE: ICD-10-CM

## 2017-10-07 DIAGNOSIS — R30.0 DYSURIA: ICD-10-CM

## 2017-10-07 DIAGNOSIS — F14.10 COCAINE ABUSE, UNCOMPLICATED: ICD-10-CM

## 2017-10-07 DIAGNOSIS — R74.0 NONSPECIFIC ELEVATION OF LEVELS OF TRANSAMINASE AND LACTIC ACID DEHYDROGENASE [LDH]: ICD-10-CM

## 2017-10-07 DIAGNOSIS — Z29.9 ENCOUNTER FOR PROPHYLACTIC MEASURES, UNSPECIFIED: ICD-10-CM

## 2017-10-07 DIAGNOSIS — D64.9 ANEMIA, UNSPECIFIED: ICD-10-CM

## 2017-10-07 LAB
ANION GAP SERPL CALC-SCNC: 13 MMOL/L — SIGNIFICANT CHANGE UP (ref 5–17)
BUN SERPL-MCNC: 12 MG/DL — SIGNIFICANT CHANGE UP (ref 7–23)
CALCIUM SERPL-MCNC: 8.2 MG/DL — LOW (ref 8.4–10.5)
CHLORIDE SERPL-SCNC: 94 MMOL/L — LOW (ref 96–108)
CO2 SERPL-SCNC: 23 MMOL/L — SIGNIFICANT CHANGE UP (ref 22–31)
CREAT SERPL-MCNC: 0.96 MG/DL — SIGNIFICANT CHANGE UP (ref 0.5–1.3)
FERRITIN SERPL-MCNC: 101.8 NG/ML — SIGNIFICANT CHANGE UP (ref 30–400)
FOLATE SERPL-MCNC: 17.7 NG/ML — SIGNIFICANT CHANGE UP (ref 4.8–24.2)
GLUCOSE SERPL-MCNC: 86 MG/DL — SIGNIFICANT CHANGE UP (ref 70–99)
HCT VFR BLD CALC: 31.3 % — LOW (ref 39–50)
HGB BLD-MCNC: 11 G/DL — LOW (ref 13–17)
HIV 1+2 AB+HIV1 P24 AG SERPL QL IA: SIGNIFICANT CHANGE UP
IRON SATN MFR SERPL: 20 % — SIGNIFICANT CHANGE UP (ref 16–55)
IRON SATN MFR SERPL: 53 UG/DL — SIGNIFICANT CHANGE UP (ref 45–165)
MAGNESIUM SERPL-MCNC: 1.9 MG/DL — SIGNIFICANT CHANGE UP (ref 1.6–2.6)
MCHC RBC-ENTMCNC: 34.1 PG — HIGH (ref 27–34)
MCHC RBC-ENTMCNC: 35.1 G/DL — SIGNIFICANT CHANGE UP (ref 32–36)
MCV RBC AUTO: 96.9 FL — SIGNIFICANT CHANGE UP (ref 80–100)
OSMOLALITY UR: 451 MOSMOL/KG — SIGNIFICANT CHANGE UP (ref 100–650)
PHOSPHATE SERPL-MCNC: 3.7 MG/DL — SIGNIFICANT CHANGE UP (ref 2.5–4.5)
PLATELET # BLD AUTO: 141 K/UL — LOW (ref 150–400)
POTASSIUM SERPL-MCNC: 4.1 MMOL/L — SIGNIFICANT CHANGE UP (ref 3.5–5.3)
POTASSIUM SERPL-SCNC: 4.1 MMOL/L — SIGNIFICANT CHANGE UP (ref 3.5–5.3)
RBC # BLD: 3.23 M/UL — LOW (ref 4.2–5.8)
RBC # FLD: 14.6 % — SIGNIFICANT CHANGE UP (ref 10.3–16.9)
SODIUM SERPL-SCNC: 130 MMOL/L — LOW (ref 135–145)
SODIUM UR-SCNC: 77 MMOL/L — SIGNIFICANT CHANGE UP
TIBC SERPL-MCNC: 270 UG/DL — SIGNIFICANT CHANGE UP (ref 220–430)
UIBC SERPL-MCNC: 236 UG/DL — SIGNIFICANT CHANGE UP (ref 110–370)
VIT B12 SERPL-MCNC: 1342 PG/ML — HIGH (ref 243–894)
WBC # BLD: 2.9 K/UL — LOW (ref 3.8–10.5)
WBC # FLD AUTO: 2.9 K/UL — LOW (ref 3.8–10.5)

## 2017-10-07 PROCEDURE — 85027 COMPLETE CBC AUTOMATED: CPT

## 2017-10-07 PROCEDURE — 83550 IRON BINDING TEST: CPT

## 2017-10-07 PROCEDURE — 71010: CPT | Mod: 26

## 2017-10-07 PROCEDURE — 71045 X-RAY EXAM CHEST 1 VIEW: CPT

## 2017-10-07 PROCEDURE — 82550 ASSAY OF CK (CPK): CPT

## 2017-10-07 PROCEDURE — 83605 ASSAY OF LACTIC ACID: CPT

## 2017-10-07 PROCEDURE — 85610 PROTHROMBIN TIME: CPT

## 2017-10-07 PROCEDURE — 93005 ELECTROCARDIOGRAM TRACING: CPT

## 2017-10-07 PROCEDURE — 82553 CREATINE MB FRACTION: CPT

## 2017-10-07 PROCEDURE — 71046 X-RAY EXAM CHEST 2 VIEWS: CPT

## 2017-10-07 PROCEDURE — 83880 ASSAY OF NATRIURETIC PEPTIDE: CPT

## 2017-10-07 PROCEDURE — 84100 ASSAY OF PHOSPHORUS: CPT

## 2017-10-07 PROCEDURE — 87389 HIV-1 AG W/HIV-1&-2 AB AG IA: CPT

## 2017-10-07 PROCEDURE — 85730 THROMBOPLASTIN TIME PARTIAL: CPT

## 2017-10-07 PROCEDURE — 83930 ASSAY OF BLOOD OSMOLALITY: CPT

## 2017-10-07 PROCEDURE — 80307 DRUG TEST PRSMV CHEM ANLYZR: CPT

## 2017-10-07 PROCEDURE — 80048 BASIC METABOLIC PNL TOTAL CA: CPT

## 2017-10-07 PROCEDURE — 83735 ASSAY OF MAGNESIUM: CPT

## 2017-10-07 PROCEDURE — 81001 URINALYSIS AUTO W/SCOPE: CPT

## 2017-10-07 PROCEDURE — 36415 COLL VENOUS BLD VENIPUNCTURE: CPT

## 2017-10-07 PROCEDURE — 80053 COMPREHEN METABOLIC PANEL: CPT

## 2017-10-07 PROCEDURE — 99285 EMERGENCY DEPT VISIT HI MDM: CPT | Mod: 25

## 2017-10-07 PROCEDURE — 87086 URINE CULTURE/COLONY COUNT: CPT

## 2017-10-07 PROCEDURE — 83935 ASSAY OF URINE OSMOLALITY: CPT

## 2017-10-07 PROCEDURE — 99239 HOSP IP/OBS DSCHRG MGMT >30: CPT

## 2017-10-07 PROCEDURE — 70450 CT HEAD/BRAIN W/O DYE: CPT

## 2017-10-07 PROCEDURE — 82728 ASSAY OF FERRITIN: CPT

## 2017-10-07 PROCEDURE — 84300 ASSAY OF URINE SODIUM: CPT

## 2017-10-07 PROCEDURE — 82803 BLOOD GASES ANY COMBINATION: CPT

## 2017-10-07 PROCEDURE — 85025 COMPLETE CBC W/AUTO DIFF WBC: CPT

## 2017-10-07 PROCEDURE — 87040 BLOOD CULTURE FOR BACTERIA: CPT

## 2017-10-07 PROCEDURE — 84484 ASSAY OF TROPONIN QUANT: CPT

## 2017-10-07 RX ORDER — BACITRACIN ZINC NEOMYCIN SULFATE POLYMYXIN B SULFATE 400; 3.5; 5 [IU]/G; MG/G; [IU]/G
1 OINTMENT TOPICAL
Qty: 0 | Refills: 0 | Status: DISCONTINUED | OUTPATIENT
Start: 2017-10-07 | End: 2017-10-07

## 2017-10-07 RX ORDER — PANTOPRAZOLE SODIUM 20 MG/1
1 TABLET, DELAYED RELEASE ORAL
Qty: 30 | Refills: 0 | OUTPATIENT
Start: 2017-10-07 | End: 2017-11-06

## 2017-10-07 RX ORDER — SPIRONOLACTONE 25 MG/1
1 TABLET, FILM COATED ORAL
Qty: 30 | Refills: 0
Start: 2017-10-07 | End: 2017-11-06

## 2017-10-07 RX ORDER — SACUBITRIL AND VALSARTAN 24; 26 MG/1; MG/1
1 TABLET, FILM COATED ORAL
Qty: 60 | Refills: 0 | OUTPATIENT
Start: 2017-10-07 | End: 2017-11-06

## 2017-10-07 RX ORDER — FUROSEMIDE 40 MG
1 TABLET ORAL
Qty: 30 | Refills: 0 | OUTPATIENT
Start: 2017-10-07 | End: 2017-11-06

## 2017-10-07 RX ORDER — BACITRACIN ZINC NEOMYCIN SULFATE POLYMYXIN B SULFATE 400; 3.5; 5 [IU]/G; MG/G; [IU]/G
1 OINTMENT TOPICAL
Qty: 1 | Refills: 0
Start: 2017-10-07

## 2017-10-07 RX ORDER — PANTOPRAZOLE SODIUM 20 MG/1
40 TABLET, DELAYED RELEASE ORAL
Qty: 0 | Refills: 0 | Status: DISCONTINUED | OUTPATIENT
Start: 2017-10-07 | End: 2017-10-07

## 2017-10-07 RX ORDER — ATORVASTATIN CALCIUM 80 MG/1
1 TABLET, FILM COATED ORAL
Qty: 30 | Refills: 0 | OUTPATIENT
Start: 2017-10-07 | End: 2017-11-06

## 2017-10-07 RX ORDER — ASPIRIN/CALCIUM CARB/MAGNESIUM 324 MG
1 TABLET ORAL
Qty: 30 | Refills: 0
Start: 2017-10-07 | End: 2017-11-06

## 2017-10-07 RX ORDER — PRASUGREL 5 MG/1
1 TABLET, FILM COATED ORAL
Qty: 30 | Refills: 0
Start: 2017-10-07 | End: 2017-11-06

## 2017-10-07 RX ADMIN — SACUBITRIL AND VALSARTAN 1 TABLET(S): 24; 26 TABLET, FILM COATED ORAL at 05:42

## 2017-10-07 RX ADMIN — PANTOPRAZOLE SODIUM 40 MILLIGRAM(S): 20 TABLET, DELAYED RELEASE ORAL at 07:01

## 2017-10-07 RX ADMIN — PRASUGREL 10 MILLIGRAM(S): 5 TABLET, FILM COATED ORAL at 11:57

## 2017-10-07 RX ADMIN — BACITRACIN ZINC NEOMYCIN SULFATE POLYMYXIN B SULFATE 1 APPLICATION(S): 400; 3.5; 5 OINTMENT TOPICAL at 07:01

## 2017-10-07 RX ADMIN — Medication 81 MILLIGRAM(S): at 11:57

## 2017-10-07 RX ADMIN — SPIRONOLACTONE 25 MILLIGRAM(S): 25 TABLET, FILM COATED ORAL at 05:43

## 2017-10-07 NOTE — DISCHARGE NOTE ADULT - ADDITIONAL INSTRUCTIONS
Please follow up with your outpatient Cardiologist Please follow up with your outpatient Cardiologist as soon as possible

## 2017-10-07 NOTE — PROGRESS NOTE ADULT - PROBLEM SELECTOR PLAN 3
Patient currently has no means to get into his apartment, as his phone, wallet, and keys are all missing.   -  consult

## 2017-10-07 NOTE — PROGRESS NOTE ADULT - PROBLEM SELECTOR PLAN 4
Hypotonic hyponatremia - Na of 130, serum osm of 267.  Has been as low as 124 on past ED visits. Elevated Uosm and Elsy - findings c/w SIADH likely 2/2 drug use and/or concomitant CHF.

## 2017-10-07 NOTE — DISCHARGE NOTE ADULT - SECONDARY DIAGNOSIS.
CHF (congestive heart failure) Anemia Cocaine abuse GERD (gastroesophageal reflux disease) Rhabdomyolysis HTN (hypertension)

## 2017-10-07 NOTE — DISCHARGE NOTE ADULT - PATIENT PORTAL LINK FT
“You can access the Follow[facility] Patient Portal, offered by [facility], by registering with the following website: [email or web address]”

## 2017-10-07 NOTE — PROGRESS NOTE ADULT - PROBLEM SELECTOR PLAN 7
Patient has LE edema as well as some vascular congestion on CXR. Last echo here at [facility] showed severe hypokinesis and an EF of 20%. Do not feel that the patient is in decompensated heart failure at the current time.   - Continue home CHF meds: Entresto, spironolactone, Coreg  - Patient only takes Lasix 3 times a week, will hold for now  - f/u CXR  - consider ECHO
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
## 2017-10-07 NOTE — DISCHARGE NOTE ADULT - PLAN OF CARE
follow up with your outpatient cardiologist You were found in an altered state requiring hospitalization. You improved with IV fluids. Take your home medications again as prescribed by your outpatient cardiologist Please attempt to cease any drug abuse, especially in the setting of your heart failure continue with your home medication continue with your home medications

## 2017-10-07 NOTE — DISCHARGE NOTE ADULT - MEDICATION SUMMARY - MEDICATIONS TO TAKE
I will START or STAY ON the medications listed below when I get home from the hospital:    Aldactone 100 mg oral tablet  -- 1 tab(s) by mouth once a day  -- Indication: For Chronic systolic congestive heart failure    Aspirin Enteric Coated 81 mg oral delayed release tablet  -- 1 tab(s) by mouth once a day  -- Indication: For Chronic systolic congestive heart failure    sacubitril-valsartan 24 mg-26 mg oral tablet  -- 1 tab(s) by mouth 2 times a day  -- Indication: For Chronic systolic congestive heart failure    Lipitor 80 mg oral tablet  -- 1 tab(s) by mouth once a day  -- Indication: For Need for prophylactic measure    Effient 10 mg oral tablet  -- 1 tab(s) by mouth once a day  -- Indication: For Need for prophylactic measure    zolpidem 5 mg oral tablet  -- 1 tab(s) by mouth once a day (at bedtime), As needed, Insomnia  -- Indication: For Need for prophylactic measure    bacitracin/neomycin/polymyxin B 400 units-3.5 mg-5000 units/g topical ointment  -- 1 application on skin 2 times a day  -- Indication: For Need for prophylactic measure    Lasix 20 mg oral tablet  -- 1 tab(s) by mouth 2 times a week   -- Indication: For Chronic systolic congestive heart failure    pantoprazole 40 mg oral delayed release tablet  -- 1 tab(s) by mouth once a day (before a meal)  -- Indication: For GERD (gastroesophageal reflux disease)

## 2017-10-07 NOTE — PROGRESS NOTE ADULT - SUBJECTIVE AND OBJECTIVE BOX
INTERVAL HPI/OVERNIGHT EVENTS: none     SUBJECTIVE: Patient seen and examined at bedside. NAD.     OBJECTIVE:    VITAL SIGNS:  ICU Vital Signs Last 24 Hrs  T(C): 36.3 (07 Oct 2017 09:33), Max: 36.6 (06 Oct 2017 18:37)  T(F): 97.3 (07 Oct 2017 09:33), Max: 97.8 (06 Oct 2017 18:37)  HR: 86 (07 Oct 2017 09:33) (85 - 102)  BP: 123/94 (07 Oct 2017 09:33) (123/94 - 148/78)  BP(mean): --  ABP: --  ABP(mean): --  RR: 17 (07 Oct 2017 09:33) (17 - 20)  SpO2: 94% (07 Oct 2017 09:33) (94% - 99%)        CAPILLARY BLOOD GLUCOSE  106 (06 Oct 2017 18:37)          PHYSICAL EXAM:  Constitutional: Somnolent, arousable to verbal stimuli  Head: NC/AT  Eyes: PERRL, clear conjunctiva  ENT: no nasal discharge; no oropharyngeal erythema or exudates; MMM  Neck: supple; no JVD  Respiratory: CTA B/L; no W/R/R, no retractions  Cardiac: +S1/S2; irregular irregular +extra beat and/or murmur, difficult to assess   Gastrointestinal: soft, NT/ND; no rebound or guarding; +BSx4  Back: spine midline, no bony tenderness or step-offs; no CVAT B/L  Extremities: WWP, no clubbing or cyanosis; 2+ pitting edema B/L LE  Neuro: AOx3, CN grossly intact         MEDICATIONS:  MEDICATIONS  (STANDING):  aspirin enteric coated 81 milliGRAM(s) Oral daily  atorvastatin 80 milliGRAM(s) Oral at bedtime  heparin  Injectable 5000 Unit(s) SubCutaneous every 8 hours  neomycin/BACItracin/polymyxin Topical Ointment 1 Application(s) Topical two times a day  pantoprazole    Tablet 40 milliGRAM(s) Oral before breakfast  prasugrel 10 milliGRAM(s) Oral daily  sacubitril 24 mG/valsartan 26 mG 1 Tablet(s) Oral two times a day  spironolactone 25 milliGRAM(s) Oral daily    MEDICATIONS  (PRN):      ALLERGIES:  Allergies    No Known Allergies    Intolerances        LABS:                        11.0   2.9   )-----------( 141      ( 07 Oct 2017 06:25 )             31.3     10-07    130<L>  |  94<L>  |  12  ----------------------------<  86  4.1   |  23  |  0.96    Ca    8.2<L>      07 Oct 2017 06:25  Phos  3.7     10-  Mg     1.9     10-07    TPro  5.4<L>  /  Alb  3.7  /  TBili  1.2  /  DBili  x   /  AST  171<H>  /  ALT  102<H>  /  AlkPhos  60  10      Urinalysis Basic - ( 06 Oct 2017 19:42 )    Color: Yellow / Appearance: Clear / S.010 / pH: x  Gluc: x / Ketone: NEGATIVE  / Bili: Negative / Urobili: 4.0 E.U./dL   Blood: x / Protein: Trace mg/dL / Nitrite: NEGATIVE   Leuk Esterase: NEGATIVE / RBC: < 5 /HPF / WBC < 5 /HPF   Sq Epi: x / Non Sq Epi: Rare /HPF / Bacteria: Present /HPF        RADIOLOGY & ADDITIONAL TESTS: Reviewed.

## 2017-10-07 NOTE — DISCHARGE NOTE ADULT - CARE PROVIDER_API CALL
Brissa Marrufo), Internal Medicine  [address] [id number]  Phone: [phone]  Fax: [phone]

## 2017-10-07 NOTE — DISCHARGE NOTE ADULT - CARE PLAN
Principal Discharge DX:	Altered mental state  Goal:	follow up with your outpatient cardiologist  Instructions for follow-up, activity and diet:	You were found in an altered state requiring hospitalization. You improved with IV fluids.  Secondary Diagnosis:	CHF (congestive heart failure)  Goal:	Take your home medications again as prescribed by your outpatient cardiologist  Secondary Diagnosis:	Anemia  Secondary Diagnosis:	Cocaine abuse  Goal:	Please attempt to cease any drug abuse, especially in the setting of your heart failure  Secondary Diagnosis:	GERD (gastroesophageal reflux disease)  Goal:	continue with your home medication  Secondary Diagnosis:	Rhabdomyolysis  Secondary Diagnosis:	HTN (hypertension)  Goal:	continue with your home medications

## 2017-10-07 NOTE — PROGRESS NOTE ADULT - PROBLEM SELECTOR PLAN 6
Suspect that the elevation in AST and ALT are due to the patient's drug use and elevated CK.  - Trend LFTs

## 2017-10-08 LAB — T PALLIDUM AB TITR SER: NEGATIVE — SIGNIFICANT CHANGE UP

## 2017-10-09 LAB
CULTURE RESULTS: SIGNIFICANT CHANGE UP
CULTURE RESULTS: SIGNIFICANT CHANGE UP
SPECIMEN SOURCE: SIGNIFICANT CHANGE UP
SPECIMEN SOURCE: SIGNIFICANT CHANGE UP

## 2017-10-12 DIAGNOSIS — E87.1 HYPO-OSMOLALITY AND HYPONATREMIA: ICD-10-CM

## 2017-10-12 DIAGNOSIS — K21.9 GASTRO-ESOPHAGEAL REFLUX DISEASE WITHOUT ESOPHAGITIS: ICD-10-CM

## 2017-10-12 DIAGNOSIS — Z87.891 PERSONAL HISTORY OF NICOTINE DEPENDENCE: ICD-10-CM

## 2017-10-12 DIAGNOSIS — Z79.82 LONG TERM (CURRENT) USE OF ASPIRIN: ICD-10-CM

## 2017-10-12 DIAGNOSIS — I11.0 HYPERTENSIVE HEART DISEASE WITH HEART FAILURE: ICD-10-CM

## 2017-10-12 DIAGNOSIS — D64.9 ANEMIA, UNSPECIFIED: ICD-10-CM

## 2017-10-12 DIAGNOSIS — F14.10 COCAINE ABUSE, UNCOMPLICATED: ICD-10-CM

## 2017-10-12 DIAGNOSIS — Z95.810 PRESENCE OF AUTOMATIC (IMPLANTABLE) CARDIAC DEFIBRILLATOR: ICD-10-CM

## 2017-10-12 DIAGNOSIS — M62.82 RHABDOMYOLYSIS: ICD-10-CM

## 2017-10-12 DIAGNOSIS — I25.10 ATHEROSCLEROTIC HEART DISEASE OF NATIVE CORONARY ARTERY WITHOUT ANGINA PECTORIS: ICD-10-CM

## 2017-10-12 DIAGNOSIS — I50.22 CHRONIC SYSTOLIC (CONGESTIVE) HEART FAILURE: ICD-10-CM

## 2017-10-12 DIAGNOSIS — R74.0 NONSPECIFIC ELEVATION OF LEVELS OF TRANSAMINASE AND LACTIC ACID DEHYDROGENASE [LDH]: ICD-10-CM

## 2017-10-12 DIAGNOSIS — G92 TOXIC ENCEPHALOPATHY: ICD-10-CM

## 2017-10-12 DIAGNOSIS — R30.0 DYSURIA: ICD-10-CM

## 2017-10-12 DIAGNOSIS — R41.82 ALTERED MENTAL STATUS, UNSPECIFIED: ICD-10-CM

## 2017-10-13 ENCOUNTER — EMERGENCY (EMERGENCY)
Facility: HOSPITAL | Age: 62
LOS: 1 days | Discharge: LEFT BEFORE DISCHARGE | End: 2017-10-13
Attending: EMERGENCY MEDICINE | Admitting: EMERGENCY MEDICINE
Payer: MEDICARE

## 2017-10-13 VITALS
TEMPERATURE: 98 F | HEART RATE: 68 BPM | OXYGEN SATURATION: 98 % | SYSTOLIC BLOOD PRESSURE: 130 MMHG | WEIGHT: 167.99 LBS | RESPIRATION RATE: 16 BRPM | DIASTOLIC BLOOD PRESSURE: 86 MMHG

## 2017-10-13 DIAGNOSIS — R05 COUGH: ICD-10-CM

## 2017-10-13 DIAGNOSIS — I50.9 HEART FAILURE, UNSPECIFIED: ICD-10-CM

## 2017-10-13 DIAGNOSIS — F17.200 NICOTINE DEPENDENCE, UNSPECIFIED, UNCOMPLICATED: ICD-10-CM

## 2017-10-13 DIAGNOSIS — Z79.899 OTHER LONG TERM (CURRENT) DRUG THERAPY: ICD-10-CM

## 2017-10-13 DIAGNOSIS — I10 ESSENTIAL (PRIMARY) HYPERTENSION: ICD-10-CM

## 2017-10-13 DIAGNOSIS — I25.10 ATHEROSCLEROTIC HEART DISEASE OF NATIVE CORONARY ARTERY WITHOUT ANGINA PECTORIS: ICD-10-CM

## 2017-10-13 DIAGNOSIS — Z79.2 LONG TERM (CURRENT) USE OF ANTIBIOTICS: ICD-10-CM

## 2017-10-13 DIAGNOSIS — Z79.82 LONG TERM (CURRENT) USE OF ASPIRIN: ICD-10-CM

## 2017-10-13 LAB
APPEARANCE UR: CLEAR — SIGNIFICANT CHANGE UP
BILIRUB UR-MCNC: NEGATIVE — SIGNIFICANT CHANGE UP
COLOR SPEC: YELLOW — SIGNIFICANT CHANGE UP
DIFF PNL FLD: NEGATIVE — SIGNIFICANT CHANGE UP
GLUCOSE UR QL: NEGATIVE — SIGNIFICANT CHANGE UP
KETONES UR-MCNC: NEGATIVE — SIGNIFICANT CHANGE UP
LEUKOCYTE ESTERASE UR-ACNC: NEGATIVE — SIGNIFICANT CHANGE UP
NITRITE UR-MCNC: NEGATIVE — SIGNIFICANT CHANGE UP
PCP SPEC-MCNC: SIGNIFICANT CHANGE UP
PH UR: 7 — SIGNIFICANT CHANGE UP (ref 5–8)
PROT UR-MCNC: NEGATIVE MG/DL — SIGNIFICANT CHANGE UP
SP GR SPEC: <=1.005 — SIGNIFICANT CHANGE UP (ref 1–1.03)
UROBILINOGEN FLD QL: 0.2 E.U./DL — SIGNIFICANT CHANGE UP

## 2017-10-13 PROCEDURE — 80307 DRUG TEST PRSMV CHEM ANLYZR: CPT

## 2017-10-13 PROCEDURE — 99283 EMERGENCY DEPT VISIT LOW MDM: CPT

## 2017-10-13 PROCEDURE — 81003 URINALYSIS AUTO W/O SCOPE: CPT

## 2017-10-13 PROCEDURE — 99284 EMERGENCY DEPT VISIT MOD MDM: CPT

## 2017-10-13 RX ORDER — SODIUM CHLORIDE 9 MG/ML
500 INJECTION INTRAMUSCULAR; INTRAVENOUS; SUBCUTANEOUS ONCE
Qty: 0 | Refills: 0 | Status: DISCONTINUED | OUTPATIENT
Start: 2017-10-13 | End: 2017-10-17

## 2017-10-13 NOTE — ED ADULT NURSE NOTE - NS ED NURSE ELOPE COMMENTS
pt refused blood drawn and doesn't wants to stay in ED for 2 to 4 hours, CHRISTOPHER Hopper and charge RN Rei made aware of.

## 2017-10-13 NOTE — ED PROVIDER NOTE - MEDICAL DECISION MAKING DETAILS
similar presentations in past with vague complaints of inabil;ity to mobilize for this patient + labs, xray in ED for further evaluation + difficult care as complaint so generalized and compliance and contributions towards care so minimal by patient similar presentations in past with vague complaints of inability to mobilize for this patient + labs, xray in ED for further evaluation + difficult care as complaint so generalized and compliance and contributions towards care so minimal by patient. Approached by nursing shortly after arrival as patient confused and upset about blood being drawn and why he wasn't being directly taken to a room upstairs and when discussed lab work  and ED assessment essential in order to determine best placement patient inflamed and apparently eloped shortly thereafter
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
## 2017-10-13 NOTE — ED ADULT TRIAGE NOTE - ARRIVAL INFO ADDITIONAL COMMENTS
pt c/o nonproductive cough, weakness, lethargy. denies chest pain, sob. recently admitted here for CHF exacerbation. as per pt, b/l LE swelling "better than before"

## 2017-10-13 NOTE — ED PROVIDER NOTE - ATTENDING CONTRIBUTION TO CARE
61M  hx htn, chf, cad, gerd, c/o cough and lethargy. generalized weakness. no fever,, no chest pain.  pt eloped prior to me performing physical exam or further evaluation.

## 2017-10-13 NOTE — ED ADULT NURSE REASSESSMENT NOTE - NS ED NURSE REASSESS COMMENT FT1
pt refused blood drawn and doesn't want to stay in ED for minimum of 4 hours, CHRISTOPHER Hopper made aware of, spoke and explain to the pt the situation.

## 2017-10-22 NOTE — ED ADULT NURSE NOTE - TOBACCO USE
[facility]  Pediatric Critical Care  History & Physical      Patient Name: Michelle Garrett  MRN: [id number]  Admission Date: 10/21/2017  Code Status: Full Code   Attending Provider: Hamilton Franklin MD   Primary Care Physician: Primary Doctor No  Principal Problem:Wheezing-associated respiratory infection    Patient information was obtained from parent    Subjective:     HPI:   Michelle is a 12m old F ex-25 weeker with a history of CLDP, ROP, and hydrocephalus s/p  shunt placement (last revision 01/2017 with Dr Lomas) admitted to [facility] P[facility] for evaluation and management of respiratory distress. On 10/17, mom brought michelle to [facility] ED for evaluation after grandmother had to administer home albuterol neb x3 for increased WOB. [place] gave more albuterol nebs and sent Michelle home. Over the next few days, symptoms worsened and on 10/19 Michelle was seen by PCP Dr Chen in [place], and sent to ED where she was admitted to [facility] for management. She had a CXR, per written report, that showed linear infiltrate of RLL so she was started on Rocephin IV. She also received doses of solumedrol, albuterol and racemic epinephrine. Viral panel came back with (+) coronavirus and (+) rhinovirus. She was transferred to [facility] PICU on for escalation of care on 10/20 due to noted increase in WOB. Today, mother and grandmother requested to be transferred to OSH for purposes of continuity of care. Michelle has been stable on HFNC while in the [facility] PICU, per documentation. Rocephin was discontinued. She was transferred here on 6L HFNC on 100%O2 with SpO2 >95% and IVF at maintenance rate.     History reviewed. No pertinent past medical history.    Past Surgical History:   Procedure Laterality Date    PERITONEAL SHUNT  2016    subgaleal shunt placement    SHUNT REVISION  2016    removal/replacement       Review of patient's allergies indicates:  No Known Allergies    Family History     None          Social  History Main Topics    Smoking status: Never Smoker    Smokeless tobacco: Not on file    Alcohol use Not on file    Drug use: Unknown    Sexual activity: Not on file       Review of Systems   Constitutional: Positive for activity change, appetite change and irritability. Negative for fever.   HENT: Positive for congestion, drooling and rhinorrhea.    Eyes: Negative.    Respiratory: Positive for cough, wheezing and stridor.    Cardiovascular: Negative.    Gastrointestinal: Negative for abdominal pain, constipation, diarrhea, nausea and vomiting.   Genitourinary: Negative for decreased urine volume.   Musculoskeletal: Negative.    Skin: Negative for rash.   Neurological: Negative for seizures.       Objective:     Vital Signs Range (Last 24H):  Temp:  [98.6 °F (37 °C)]   Pulse:  []   Resp:  [17-28]   BP: (107-118)/(65-68)   SpO2:  [88 %-100 %]     I & O (Last 24H):  Intake/Output Summary (Last 24 hours) at 10/22/17 0025  Last data filed at 10/21/17 2300   Gross per 24 hour   Intake            18.33 ml   Output               25 ml   Net            -6.67 ml       Ventilator Data (Last 24H):     Oxygen Concentration (%):  [40-50] 40    Hemodynamic Parameters (Last 24H):       Physical Exam:  Physical Exam   Constitutional: She is active. She appears distressed (mild).   HENT:   Head: Atraumatic. No signs of injury.   Right Ear: Tympanic membrane normal.   Left Ear: Tympanic membrane normal.   Nose: Nasal discharge (rhinorrhea) present.   Mouth/Throat: Mucous membranes are moist. Oropharynx is clear. Pharynx is normal.   Eyes: EOM are normal. Pupils are equal, round, and reactive to light. Right eye exhibits no discharge. Left eye exhibits no discharge.   Neck: Normal range of motion. No neck rigidity.   Cardiovascular: Regular rhythm, S1 normal and S2 normal.  Tachycardia present.  Pulses are palpable.    No murmur heard.  Pulmonary/Chest:   Tight air entry bilaterally, scattered wheezes with prominent  transmitted upper airway breath sounds. Significant subcostal and suprasternal retractions, less pronounced when asleep. Nasal cannula in place.   Abdominal: Soft. Bowel sounds are normal. She exhibits no distension. There is no hepatosplenomegaly. There is no tenderness. There is no guarding.   Musculoskeletal: Normal range of motion.   Lymphadenopathy:     She has no cervical adenopathy.   Neurological: She is alert. She displays normal reflexes. No cranial nerve deficit. She exhibits normal muscle tone. Coordination normal.    shunt in place   Skin: Skin is warm and dry. Capillary refill takes less than 2 seconds. No rash noted. No pallor.       Lines/Drains/Airways     Peripheral Intravenous Line                 Peripheral IV - Single Lumen 10/21/17 2140 Left Hand less than 1 day                Laboratory (Last 24H):   All pertinent labs within the past 24 hours have been reviewed.      Assessment/Plan:     * Wheezing-associated respiratory infection    12m old F admitted in respiratory distress as transfer from [facility]. Hx prematurity and CLDP, has albuterol nebs at home PRN. Labs all reassuring. (+) corona/rhino/enteroviruses from viral panel at [facility].     Neuro: stable  - tylenol PRN fever    CVS: stable, intermittently tachycardic when irritated    Resp: (+) corona/rhino/enterovirus  - on 12L via HFNC  - RR 10-14 while asleep, taking long deep breaths but apparently comfortable and maintaining SpO2 >90%  - CXR from [place] showing RLL infiltrates, s/p one dose rocephin but dc'd with pos viral panel  - will repeat CXR in AM  - decadron q6hr IV  - albuterol 2.5mg q4 PRN  - racemic-epi q4 PRN    FENGI:  - NPO for now  - mIVF  - famotidine GIppx    Renal: monitor I+Os    Heme: stable  - no AM labs     ID: contact precautions, will hold off on antibiotics for now    Social: mother and grandmother at bedside, all questions asked and answered  Dispo: pending improvement in respiratory status and off  oxygen >24hr            Critical Care Time greater than: 2 Hours    Terra Aguero MD  Pediatric Critical Care  [facility]   Never smoker

## 2017-11-02 ENCOUNTER — EMERGENCY (EMERGENCY)
Facility: HOSPITAL | Age: 62
LOS: 1 days | Discharge: HOME | End: 2017-11-02
Attending: EMERGENCY MEDICINE | Admitting: EMERGENCY MEDICINE
Payer: MEDICARE

## 2017-11-02 VITALS
SYSTOLIC BLOOD PRESSURE: 66 MMHG | HEART RATE: 55 BPM | DIASTOLIC BLOOD PRESSURE: 49 MMHG | OXYGEN SATURATION: 96 % | WEIGHT: 154.98 LBS | RESPIRATION RATE: 18 BRPM

## 2017-11-02 VITALS
SYSTOLIC BLOOD PRESSURE: 82 MMHG | OXYGEN SATURATION: 98 % | HEART RATE: 52 BPM | RESPIRATION RATE: 16 BRPM | DIASTOLIC BLOOD PRESSURE: 51 MMHG

## 2017-11-02 LAB
ALBUMIN SERPL ELPH-MCNC: 3.2 G/DL — LOW (ref 3.3–5)
ALBUMIN SERPL ELPH-MCNC: 3.4 G/DL — SIGNIFICANT CHANGE UP (ref 3.3–5)
ALP SERPL-CCNC: 65 U/L — SIGNIFICANT CHANGE UP (ref 40–120)
ALP SERPL-CCNC: 71 U/L — SIGNIFICANT CHANGE UP (ref 40–120)
ALT FLD-CCNC: 17 U/L — SIGNIFICANT CHANGE UP (ref 10–45)
ALT FLD-CCNC: 19 U/L — SIGNIFICANT CHANGE UP (ref 10–45)
AMPHET UR-MCNC: NEGATIVE — SIGNIFICANT CHANGE UP
ANION GAP SERPL CALC-SCNC: 13 MMOL/L — SIGNIFICANT CHANGE UP (ref 5–17)
ANION GAP SERPL CALC-SCNC: 15 MMOL/L — SIGNIFICANT CHANGE UP (ref 5–17)
APPEARANCE UR: CLEAR — SIGNIFICANT CHANGE UP
APTT BLD: 28.5 SEC — SIGNIFICANT CHANGE UP (ref 27.5–37.4)
AST SERPL-CCNC: 20 U/L — SIGNIFICANT CHANGE UP (ref 10–40)
AST SERPL-CCNC: 22 U/L — SIGNIFICANT CHANGE UP (ref 10–40)
BARBITURATES UR SCN-MCNC: NEGATIVE — SIGNIFICANT CHANGE UP
BASOPHILS NFR BLD AUTO: 0.6 % — SIGNIFICANT CHANGE UP (ref 0–2)
BENZODIAZ UR-MCNC: NEGATIVE — SIGNIFICANT CHANGE UP
BILIRUB SERPL-MCNC: 0.6 MG/DL — SIGNIFICANT CHANGE UP (ref 0.2–1.2)
BILIRUB SERPL-MCNC: 0.6 MG/DL — SIGNIFICANT CHANGE UP (ref 0.2–1.2)
BILIRUB UR-MCNC: NEGATIVE — SIGNIFICANT CHANGE UP
BLD GP AB SCN SERPL QL: NEGATIVE — SIGNIFICANT CHANGE UP
BUN SERPL-MCNC: 12 MG/DL — SIGNIFICANT CHANGE UP (ref 7–23)
BUN SERPL-MCNC: 13 MG/DL — SIGNIFICANT CHANGE UP (ref 7–23)
CALCIUM SERPL-MCNC: 7.6 MG/DL — LOW (ref 8.4–10.5)
CALCIUM SERPL-MCNC: 8.4 MG/DL — SIGNIFICANT CHANGE UP (ref 8.4–10.5)
CHLORIDE SERPL-SCNC: 83 MMOL/L — LOW (ref 96–108)
CHLORIDE SERPL-SCNC: 87 MMOL/L — LOW (ref 96–108)
CK MB CFR SERPL CALC: 6.2 NG/ML — SIGNIFICANT CHANGE UP (ref 0–6.7)
CK SERPL-CCNC: 163 U/L — SIGNIFICANT CHANGE UP (ref 30–200)
CO2 SERPL-SCNC: 20 MMOL/L — LOW (ref 22–31)
CO2 SERPL-SCNC: 21 MMOL/L — LOW (ref 22–31)
COCAINE METAB.OTHER UR-MCNC: POSITIVE
COLOR SPEC: YELLOW — SIGNIFICANT CHANGE UP
CREAT ?TM UR-MCNC: 79 MG/DL — SIGNIFICANT CHANGE UP
CREAT SERPL-MCNC: 0.87 MG/DL — SIGNIFICANT CHANGE UP (ref 0.5–1.3)
CREAT SERPL-MCNC: 0.88 MG/DL — SIGNIFICANT CHANGE UP (ref 0.5–1.3)
DIFF PNL FLD: NEGATIVE — SIGNIFICANT CHANGE UP
EOSINOPHIL NFR BLD AUTO: 7.9 % — HIGH (ref 0–6)
GLUCOSE SERPL-MCNC: 114 MG/DL — HIGH (ref 70–99)
GLUCOSE SERPL-MCNC: 121 MG/DL — HIGH (ref 70–99)
GLUCOSE UR QL: NEGATIVE — SIGNIFICANT CHANGE UP
HCT VFR BLD CALC: 29.1 % — LOW (ref 39–50)
HGB BLD-MCNC: 10.6 G/DL — LOW (ref 13–17)
INR BLD: 1.04 — SIGNIFICANT CHANGE UP (ref 0.88–1.16)
KETONES UR-MCNC: NEGATIVE — SIGNIFICANT CHANGE UP
LACTATE SERPL-SCNC: 0.8 MMOL/L — SIGNIFICANT CHANGE UP (ref 0.5–2)
LEUKOCYTE ESTERASE UR-ACNC: NEGATIVE — SIGNIFICANT CHANGE UP
LYMPHOCYTES # BLD AUTO: 38.6 % — SIGNIFICANT CHANGE UP (ref 13–44)
MAGNESIUM SERPL-MCNC: 1.7 MG/DL — SIGNIFICANT CHANGE UP (ref 1.6–2.6)
MCHC RBC-ENTMCNC: 33.1 PG — SIGNIFICANT CHANGE UP (ref 27–34)
MCHC RBC-ENTMCNC: 36.4 G/DL — HIGH (ref 32–36)
MCV RBC AUTO: 90.9 FL — SIGNIFICANT CHANGE UP (ref 80–100)
METHADONE UR-MCNC: NEGATIVE — SIGNIFICANT CHANGE UP
MONOCYTES NFR BLD AUTO: 9 % — SIGNIFICANT CHANGE UP (ref 2–14)
NEUTROPHILS NFR BLD AUTO: 43.9 % — SIGNIFICANT CHANGE UP (ref 43–77)
NITRITE UR-MCNC: NEGATIVE — SIGNIFICANT CHANGE UP
NT-PROBNP SERPL-SCNC: 699 PG/ML — HIGH (ref 0–300)
OPIATES UR-MCNC: NEGATIVE — SIGNIFICANT CHANGE UP
PCP SPEC-MCNC: SIGNIFICANT CHANGE UP
PCP UR-MCNC: NEGATIVE — SIGNIFICANT CHANGE UP
PH UR: 6 — SIGNIFICANT CHANGE UP (ref 5–8)
PLATELET # BLD AUTO: 161 K/UL — SIGNIFICANT CHANGE UP (ref 150–400)
POTASSIUM SERPL-MCNC: 4 MMOL/L — SIGNIFICANT CHANGE UP (ref 3.5–5.3)
POTASSIUM SERPL-MCNC: 4 MMOL/L — SIGNIFICANT CHANGE UP (ref 3.5–5.3)
POTASSIUM SERPL-SCNC: 4 MMOL/L — SIGNIFICANT CHANGE UP (ref 3.5–5.3)
POTASSIUM SERPL-SCNC: 4 MMOL/L — SIGNIFICANT CHANGE UP (ref 3.5–5.3)
PROT SERPL-MCNC: 5.4 G/DL — LOW (ref 6–8.3)
PROT SERPL-MCNC: 5.6 G/DL — LOW (ref 6–8.3)
PROT UR-MCNC: NEGATIVE MG/DL — SIGNIFICANT CHANGE UP
PROTHROM AB SERPL-ACNC: 11.5 SEC — SIGNIFICANT CHANGE UP (ref 9.8–12.7)
RBC # BLD: 3.2 M/UL — LOW (ref 4.2–5.8)
RBC # FLD: 12.4 % — SIGNIFICANT CHANGE UP (ref 10.3–16.9)
RH IG SCN BLD-IMP: POSITIVE — SIGNIFICANT CHANGE UP
SODIUM SERPL-SCNC: 119 MMOL/L — CRITICAL LOW (ref 135–145)
SODIUM SERPL-SCNC: 120 MMOL/L — CRITICAL LOW (ref 135–145)
SODIUM UR-SCNC: 53 MMOL/L — SIGNIFICANT CHANGE UP
SP GR SPEC: 1.01 — SIGNIFICANT CHANGE UP (ref 1–1.03)
THC UR QL: NEGATIVE — SIGNIFICANT CHANGE UP
TROPONIN T SERPL-MCNC: <0.01 NG/ML — SIGNIFICANT CHANGE UP (ref 0–0.01)
UROBILINOGEN FLD QL: 0.2 E.U./DL — SIGNIFICANT CHANGE UP
WBC # BLD: 3.6 K/UL — LOW (ref 3.8–10.5)
WBC # FLD AUTO: 3.6 K/UL — LOW (ref 3.8–10.5)

## 2017-11-02 PROCEDURE — 87086 URINE CULTURE/COLONY COUNT: CPT

## 2017-11-02 PROCEDURE — 99291 CRITICAL CARE FIRST HOUR: CPT | Mod: 25

## 2017-11-02 PROCEDURE — 70486 CT MAXILLOFACIAL W/O DYE: CPT

## 2017-11-02 PROCEDURE — 85025 COMPLETE CBC W/AUTO DIFF WBC: CPT

## 2017-11-02 PROCEDURE — 99284 EMERGENCY DEPT VISIT MOD MDM: CPT | Mod: 25

## 2017-11-02 PROCEDURE — 86900 BLOOD TYPING SEROLOGIC ABO: CPT

## 2017-11-02 PROCEDURE — 36415 COLL VENOUS BLD VENIPUNCTURE: CPT

## 2017-11-02 PROCEDURE — 72125 CT NECK SPINE W/O DYE: CPT | Mod: 26

## 2017-11-02 PROCEDURE — 87040 BLOOD CULTURE FOR BACTERIA: CPT

## 2017-11-02 PROCEDURE — 81003 URINALYSIS AUTO W/O SCOPE: CPT

## 2017-11-02 PROCEDURE — 83930 ASSAY OF BLOOD OSMOLALITY: CPT

## 2017-11-02 PROCEDURE — 85730 THROMBOPLASTIN TIME PARTIAL: CPT

## 2017-11-02 PROCEDURE — 70486 CT MAXILLOFACIAL W/O DYE: CPT | Mod: 26

## 2017-11-02 PROCEDURE — 71045 X-RAY EXAM CHEST 1 VIEW: CPT

## 2017-11-02 PROCEDURE — 82962 GLUCOSE BLOOD TEST: CPT

## 2017-11-02 PROCEDURE — 72125 CT NECK SPINE W/O DYE: CPT

## 2017-11-02 PROCEDURE — 83880 ASSAY OF NATRIURETIC PEPTIDE: CPT

## 2017-11-02 PROCEDURE — 93010 ELECTROCARDIOGRAM REPORT: CPT

## 2017-11-02 PROCEDURE — 84484 ASSAY OF TROPONIN QUANT: CPT

## 2017-11-02 PROCEDURE — 82570 ASSAY OF URINE CREATININE: CPT

## 2017-11-02 PROCEDURE — 80053 COMPREHEN METABOLIC PANEL: CPT

## 2017-11-02 PROCEDURE — 71010: CPT | Mod: 26

## 2017-11-02 PROCEDURE — 70450 CT HEAD/BRAIN W/O DYE: CPT | Mod: 26

## 2017-11-02 PROCEDURE — 84300 ASSAY OF URINE SODIUM: CPT

## 2017-11-02 PROCEDURE — 80307 DRUG TEST PRSMV CHEM ANLYZR: CPT

## 2017-11-02 PROCEDURE — 86901 BLOOD TYPING SEROLOGIC RH(D): CPT

## 2017-11-02 PROCEDURE — 85610 PROTHROMBIN TIME: CPT

## 2017-11-02 PROCEDURE — 82553 CREATINE MB FRACTION: CPT

## 2017-11-02 PROCEDURE — 93005 ELECTROCARDIOGRAM TRACING: CPT

## 2017-11-02 PROCEDURE — 70450 CT HEAD/BRAIN W/O DYE: CPT

## 2017-11-02 PROCEDURE — 83735 ASSAY OF MAGNESIUM: CPT

## 2017-11-02 PROCEDURE — 90471 IMMUNIZATION ADMIN: CPT

## 2017-11-02 PROCEDURE — 86850 RBC ANTIBODY SCREEN: CPT

## 2017-11-02 PROCEDURE — 83605 ASSAY OF LACTIC ACID: CPT

## 2017-11-02 PROCEDURE — 82550 ASSAY OF CK (CPK): CPT

## 2017-11-02 PROCEDURE — 83935 ASSAY OF URINE OSMOLALITY: CPT

## 2017-11-02 PROCEDURE — 90715 TDAP VACCINE 7 YRS/> IM: CPT

## 2017-11-02 RX ORDER — TETANUS TOXOID, REDUCED DIPHTHERIA TOXOID AND ACELLULAR PERTUSSIS VACCINE, ADSORBED 5; 2.5; 8; 8; 2.5 [IU]/.5ML; [IU]/.5ML; UG/.5ML; UG/.5ML; UG/.5ML
0.5 SUSPENSION INTRAMUSCULAR ONCE
Qty: 0 | Refills: 0 | Status: COMPLETED | OUTPATIENT
Start: 2017-11-02 | End: 2017-11-02

## 2017-11-02 RX ORDER — CEPHALEXIN 500 MG
1 CAPSULE ORAL
Qty: 28 | Refills: 0
Start: 2017-11-02 | End: 2017-11-09

## 2017-11-02 RX ORDER — CEPHALEXIN 500 MG
500 CAPSULE ORAL ONCE
Qty: 0 | Refills: 0 | Status: COMPLETED | OUTPATIENT
Start: 2017-11-02 | End: 2017-11-02

## 2017-11-02 RX ORDER — SODIUM CHLORIDE 9 MG/ML
1000 INJECTION INTRAMUSCULAR; INTRAVENOUS; SUBCUTANEOUS ONCE
Qty: 0 | Refills: 0 | Status: COMPLETED | OUTPATIENT
Start: 2017-11-02 | End: 2017-11-02

## 2017-11-02 RX ORDER — NALOXONE HYDROCHLORIDE 4 MG/.1ML
0.2 SPRAY NASAL ONCE
Qty: 0 | Refills: 0 | Status: COMPLETED | OUTPATIENT
Start: 2017-11-02 | End: 2017-11-02

## 2017-11-02 RX ORDER — SODIUM CHLORIDE 9 MG/ML
1000 INJECTION INTRAMUSCULAR; INTRAVENOUS; SUBCUTANEOUS
Qty: 0 | Refills: 0 | Status: DISCONTINUED | OUTPATIENT
Start: 2017-11-02 | End: 2017-11-11

## 2017-11-02 RX ADMIN — SODIUM CHLORIDE 4000 MILLILITER(S): 9 INJECTION INTRAMUSCULAR; INTRAVENOUS; SUBCUTANEOUS at 09:32

## 2017-11-02 RX ADMIN — SODIUM CHLORIDE 4000 MILLILITER(S): 9 INJECTION INTRAMUSCULAR; INTRAVENOUS; SUBCUTANEOUS at 09:15

## 2017-11-02 RX ADMIN — NALOXONE HYDROCHLORIDE 0.2 MILLIGRAM(S): 4 SPRAY NASAL at 09:31

## 2017-11-02 RX ADMIN — TETANUS TOXOID, REDUCED DIPHTHERIA TOXOID AND ACELLULAR PERTUSSIS VACCINE, ADSORBED 0.5 MILLILITER(S): 5; 2.5; 8; 8; 2.5 SUSPENSION INTRAMUSCULAR at 10:48

## 2017-11-02 RX ADMIN — Medication 500 MILLIGRAM(S): at 10:48

## 2017-11-02 NOTE — ED PROVIDER NOTE - CRITICAL CARE PROVIDED
additional history taking/direct patient care (not related to procedure)/interpretation of diagnostic studies/documentation

## 2017-11-02 NOTE — ED ADULT TRIAGE NOTE - CHIEF COMPLAINT QUOTE
Pt BIBA s/p Syncope.   pt arrived to ED A&Ox3 but CO dizziness and noted with obvious injury to face.  Pt states "I didn't feel anything I just went down."  PT denies CP, Dizziness, N/V/D, SOB, Fevers at this time.  Pt upgraded to MD Director.

## 2017-11-02 NOTE — ED PROVIDER NOTE - PROGRESS NOTE DETAILS
Pt awake and alert.  States he wants to leave.  BP slightly improved.  Pt reports his bp is normally 90.  BP 83 now.  Temp improved.   Pt understands he has low na and I am concerned about this and recommend admit for his low bp, low na.  CT head and c spine pending - discussed w radiology to expedite result.  Pt agreeing to repeat na and ct results before dc.  Pt states he is tired and out of it 2/2 poor sleep x 2 d, uri/bronchitis and meds prescribed by [facility] y[facility] (benadryl, abx).  Pt wants to leave ama - discussed my concerns, risk for repeat syncope, trauma, tbi, low na leading to arrhythmia, weakness, brain swelling, death.  Pt understands risk and wants to leave. Pt still insisting he wants to leave,  Repeat na pending.  CT w/o acute process head/cspine.  Will dc ama. Pt still insisting he wants to leave, ambulatory in ed. Repeat na pending.  CT w/o acute process head/cspine.  Will dc ama.

## 2017-11-02 NOTE — ED PROVIDER NOTE - MEDICAL DECISION MAKING DETAILS
Pt w complex medical history complicated by substance abuse biba w syncope c/o ams, weakness, diarrhea in ed but denies prior, hypotension.  Temp low.  ? 2/2 substance abuse, infection, cardiac event, lyte abnl, chi.  Plan labs, ct, ivf, bp support prn, reassess.

## 2017-11-02 NOTE — ED PROVIDER NOTE - PMH
CAD (coronary artery disease)    CHF (congestive heart failure)    GERD (gastroesophageal reflux disease)    HTN (hypertension) CAD (coronary artery disease)    CHF (congestive heart failure)    Cocaine abuse    GERD (gastroesophageal reflux disease)    HTN (hypertension)

## 2017-11-02 NOTE — ED PROVIDER NOTE - SKIN, MLM
Skin normal color for race, warm, dry and intact. No evidence of rash. as above, shallow old lacerations bilat hands w/o erythema, warmth, ttp
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
## 2017-11-02 NOTE — ED PROVIDER NOTE - HEAD, MLM
nose deformity, L shaped lac on bridge, + ttp, no septal hematoma, upper lip swelling, ecchymosis, inner lip shallow lac, dentition intact and nontender, no facial bone ttp other than nose, Head shape is symmetrical.

## 2017-11-02 NOTE — ED PROVIDER NOTE - CARE PLAN
Principal Discharge DX:	Altered mental status, unspecified  Secondary Diagnosis:	Hypotension  Secondary Diagnosis:	Hyponatremia

## 2017-11-02 NOTE — ED PROVIDER NOTE - NEUROLOGICAL, MLM
somnolent,  oriented x 3, CN II-XII grossly intact, motor 5/5, no gross sens deficits, speech clear.

## 2017-11-03 LAB
CULTURE RESULTS: NO GROWTH — SIGNIFICANT CHANGE UP
SPECIMEN SOURCE: SIGNIFICANT CHANGE UP

## 2017-11-09 DIAGNOSIS — I10 ESSENTIAL (PRIMARY) HYPERTENSION: ICD-10-CM
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
## 2017-11-09 DIAGNOSIS — I95.9 HYPOTENSION, UNSPECIFIED: ICD-10-CM

## 2017-11-09 DIAGNOSIS — Z79.899 OTHER LONG TERM (CURRENT) DRUG THERAPY: ICD-10-CM
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
## 2017-11-09 DIAGNOSIS — Z79.2 LONG TERM (CURRENT) USE OF ANTIBIOTICS: ICD-10-CM

## 2017-11-09 DIAGNOSIS — Z79.82 LONG TERM (CURRENT) USE OF ASPIRIN: ICD-10-CM

## 2017-11-09 DIAGNOSIS — S01.21XA LACERATION WITHOUT FOREIGN BODY OF NOSE, INITIAL ENCOUNTER: ICD-10-CM

## 2017-11-09 DIAGNOSIS — E87.1 HYPO-OSMOLALITY AND HYPONATREMIA: ICD-10-CM

## 2017-11-09 DIAGNOSIS — I25.10 ATHEROSCLEROTIC HEART DISEASE OF NATIVE CORONARY ARTERY WITHOUT ANGINA PECTORIS: ICD-10-CM
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
## 2017-11-09 DIAGNOSIS — R55 SYNCOPE AND COLLAPSE: ICD-10-CM

## 2017-11-09 DIAGNOSIS — R41.82 ALTERED MENTAL STATUS, UNSPECIFIED: ICD-10-CM

## 2018-01-26 NOTE — ED PROVIDER NOTE - SKIN NEGATIVE STATEMENT, MLM
"Neel Rodríguez is a 3year old male who presents for 3year-old well child exam.  Patient presents with maternal aunt with mom's permission. Concerns raised today include: f/u lead level - slightly elevated lead level in November of 2016. F/u level was ordered but not completed at that time. Aunt reports there were no other concerns. No new concerns about behavior. He is currently attending a program at [facility]. Elimination: normal voiding and stooling pattern  Feeding: Diet is adequate. Milk intake: drinks at least 2 glasses of milk per day - parents work to limit the milk intake as aunt indicates he would drink much more if allowed. SOCIAL:  Primary caretakers: mom and dad  Siblings: half sibs on dad's side   or schooling: [employer] - focused on behavior    DEVELOPMENT:  washes and dries hands, imitates vertical line, names 4 animal pictures, identifies 2 animal's pictures: ""Which flies? Meows? Barks? Gallops?, throws ball overhand and pedals tricycle    Birth history, medical history, surgical history, and family history reviewed and updated. PHYSICAL EXAM:  Pulse 104, temperature 97 Â°F (36.1 Â°C), temperature source Tympanic, resp. rate 20, height 3' 4\"" (1.016 m), weight (!) 19.4 kg, head circumference 52 cm (20.47\""). GENERAL:  Well appearing  male, nontoxic, no acute distress. Alert and interactive. SKIN: Warm, normal turgor. No cyanosis. No bruises or lesions. HEAD:  Normocephalic, atraumatic. EYES:  Conjunctivae without injection or icterus. Pupils equal, round, reactive to light; extraocular movements intact. NOSE: No flaring. EARS:  Tympanic membranes transparent with good landmarks. THROAT:  Oropharynx with moist mucous membranes and no lesions. NECK:  Supple, no lymphadenopathy or masses. HEART:  Regular rate and rhythm. Quiet precordium. Normal S1, S2. No murmurs, rubs, gallops. LUNGS:  Clear to auscultation bilaterally.   No wheezes, rales, " rhonchi. Normal work of breathing. ABDOMEN:  Soft, nontender. No organomegaly or masses. GENITOURINARY:  Cm 1 male; testes bilaterally descended. EXTREMITIES:  Warm, dry, without abnormalities. NEUROLOGIC:  Normal tone, bulk, strength. ASSESSMENT:  3year old male well child. History of slightly elevated lead level on Nov of 2016    PLAN:  (R78.71) Elevated blood lead level  (primary encounter diagnosis)  Plan: LEAD BLOOD/VENOUS        Will contact parent with result and any needed follow up. All parental concerns and questions discussed. Anticipatory guidance provided, handout given. Safety/car/bicycle/fire/sharp objects/falls/water              Development              Discipline              Diet              Television              Analgesics/antipyretics              Sun exposure              Tobacco-free home              Dental care              Lead exposure risk: urban-dwelling family                Immunizations per orders. Risks, benefits, and side effects discussed. Return to clinic in 1 year for well child examination or sooner prn illness/concerns. Aunt is encouraged to invite mom to call with any questions about today's appointment. no abrasions, no jaundice, no lesions, no pruritis, and no rashes.

## 2018-04-20 ENCOUNTER — EMERGENCY (EMERGENCY)
Facility: HOSPITAL | Age: 63
LOS: 1 days | Discharge: HOME | End: 2018-04-20
Attending: EMERGENCY MEDICINE | Admitting: EMERGENCY MEDICINE
Payer: MEDICARE

## 2018-04-20 VITALS
RESPIRATION RATE: 16 BRPM | SYSTOLIC BLOOD PRESSURE: 142 MMHG | WEIGHT: 139.99 LBS | TEMPERATURE: 97 F | HEART RATE: 68 BPM | DIASTOLIC BLOOD PRESSURE: 94 MMHG | OXYGEN SATURATION: 95 %

## 2018-04-20 DIAGNOSIS — S01.512A LACERATION WITHOUT FOREIGN BODY OF ORAL CAVITY, INITIAL ENCOUNTER: ICD-10-CM

## 2018-04-20 DIAGNOSIS — E78.5 HYPERLIPIDEMIA, UNSPECIFIED: ICD-10-CM

## 2018-04-20 DIAGNOSIS — Z79.82 LONG TERM (CURRENT) USE OF ASPIRIN: ICD-10-CM

## 2018-04-20 DIAGNOSIS — I11.0 HYPERTENSIVE HEART DISEASE WITH HEART FAILURE: ICD-10-CM
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
## 2018-04-20 DIAGNOSIS — I50.9 HEART FAILURE, UNSPECIFIED: ICD-10-CM

## 2018-04-20 DIAGNOSIS — I25.10 ATHEROSCLEROTIC HEART DISEASE OF NATIVE CORONARY ARTERY WITHOUT ANGINA PECTORIS: ICD-10-CM

## 2018-04-20 DIAGNOSIS — S01.01XA LACERATION WITHOUT FOREIGN BODY OF SCALP, INITIAL ENCOUNTER: ICD-10-CM
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
## 2018-04-20 DIAGNOSIS — Z95.5 PRESENCE OF CORONARY ANGIOPLASTY IMPLANT AND GRAFT: Chronic | ICD-10-CM

## 2018-04-20 DIAGNOSIS — R56.9 UNSPECIFIED CONVULSIONS: ICD-10-CM

## 2018-04-20 DIAGNOSIS — F17.200 NICOTINE DEPENDENCE, UNSPECIFIED, UNCOMPLICATED: ICD-10-CM

## 2018-04-20 PROCEDURE — 85730 THROMBOPLASTIN TIME PARTIAL: CPT

## 2018-04-20 PROCEDURE — 99284 EMERGENCY DEPT VISIT MOD MDM: CPT | Mod: 25

## 2018-04-20 PROCEDURE — 80053 COMPREHEN METABOLIC PANEL: CPT

## 2018-04-20 PROCEDURE — 36415 COLL VENOUS BLD VENIPUNCTURE: CPT

## 2018-04-20 PROCEDURE — 90471 IMMUNIZATION ADMIN: CPT

## 2018-04-20 PROCEDURE — 85610 PROTHROMBIN TIME: CPT

## 2018-04-20 PROCEDURE — 93005 ELECTROCARDIOGRAM TRACING: CPT

## 2018-04-20 PROCEDURE — 85025 COMPLETE CBC W/AUTO DIFF WBC: CPT

## 2018-04-20 PROCEDURE — 99283 EMERGENCY DEPT VISIT LOW MDM: CPT | Mod: 25

## 2018-04-20 PROCEDURE — 90715 TDAP VACCINE 7 YRS/> IM: CPT

## 2018-04-20 PROCEDURE — 80307 DRUG TEST PRSMV CHEM ANLYZR: CPT

## 2018-04-20 PROCEDURE — 93010 ELECTROCARDIOGRAM REPORT: CPT

## 2018-04-20 RX ORDER — TETANUS TOXOID, REDUCED DIPHTHERIA TOXOID AND ACELLULAR PERTUSSIS VACCINE, ADSORBED 5; 2.5; 8; 8; 2.5 [IU]/.5ML; [IU]/.5ML; UG/.5ML; UG/.5ML; UG/.5ML
0.5 SUSPENSION INTRAMUSCULAR ONCE
Qty: 0 | Refills: 0 | Status: COMPLETED | OUTPATIENT
Start: 2018-04-20 | End: 2018-04-20

## 2018-04-20 RX ADMIN — TETANUS TOXOID, REDUCED DIPHTHERIA TOXOID AND ACELLULAR PERTUSSIS VACCINE, ADSORBED 0.5 MILLILITER(S): 5; 2.5; 8; 8; 2.5 SUSPENSION INTRAMUSCULAR at 17:00

## 2018-04-20 NOTE — ED ADULT NURSE NOTE - PMH
CAD (coronary artery disease)    CHF (congestive heart failure)    Cocaine abuse    GERD (gastroesophageal reflux disease)    HTN (hypertension)

## 2018-04-20 NOTE — ED PROVIDER NOTE - MUSCULOSKELETAL, MLM
Spine appears normal, no midline spine tenderness, range of motion is not limited, no muscle or joint tenderness

## 2018-04-20 NOTE — ED PROVIDER NOTE - NEUROLOGICAL, MLM
Alert and oriented, no focal deficits, no motor or sensory deficits.  CN II-XII intact, strength 5/5 b/l upper and lower ext, sensation intact distally b/l upper and lower ext, finger to nose intact

## 2018-04-20 NOTE — ED PROVIDER NOTE - ENMT, MLM
small lac to tongue, +tongue fasciculations, dentition intact, 2 cm laceration to left parietal area of scalp
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
## 2018-04-20 NOTE — ED PROVIDER NOTE - OBJECTIVE STATEMENT
61 y/o m hx CAD, CHF EF 20%, AICD, HTN, HLD, cocaine abuse presents BIBA after suspected seizure.  Pt stating the last thing he remembers is walking to [employer], woke up in ambulance.  Per EMS pt had witnessed syncope with shaking, bit his tongue, urinated on himself, laceration to back of head.  Pt stating he has some pain to his head, otherwise feels fine and wants to leave.  Pt denies hx seizures, drug use today, fever, chills, CP, SOB, all other ROS negative.

## 2018-04-20 NOTE — ED PROVIDER NOTE - MEDICAL DECISION MAKING DETAILS
61 y/o m hx CAD, CHF EF 20%, AICD, HTN, HLD, cocaine abuse presents BIBA after suspected seizure.  Pt adamant about leaving ED, is alert and oriented, no evidence of intoxication.  Noted from previous labs has had hyponatremia 120.  Discussed with pt very likely he had seizure today, want to get workup including labs, CT head, admission.  Pt refusing to stay, consented to staple scalp laceration and receive tetanus vaccine.  Explained to pt could have had cardiac event, possible ICH, brain mass, hyponatremia, other metabolic seizure etiology and could potentially worsen if leaves AMA, possible permanent disability, possible death.  Pt verbalizing understanding, signed AMA, advised to return immediately if seeks medical treatment.
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
## 2018-04-20 NOTE — ED PROVIDER NOTE - CONSTITUTIONAL, MLM
normal... lying in stretcher, has blood stain from scalp wound, in c-collar, has urinated on himself

## 2018-04-20 NOTE — ED PROVIDER NOTE - ATTENDING CONTRIBUTION TO CARE
possible recurrent seizure vs syncope.  patient initially confused but mental status normalized in ed.  after waking/ initial eval by pa, refused additional workup/ treatment.  states he has been in the hospital many times for similar and does not want to stay again today.  discussed concern for hyponatremia, electrolyte abnormality, head injury/ intracranial bleed, desire to do labs, ct, possibly admit but refused.  labs sent while patient still post ictal but refused to wait for results.  alert and oriented x3 with steady gait.  understands potential life threats related to incomplete workup/treatment and accepts.  to sign out ama

## 2018-04-24 ENCOUNTER — EMERGENCY (EMERGENCY)
Facility: HOSPITAL | Age: 63
LOS: 1 days | Discharge: HOME | End: 2018-04-24
Attending: EMERGENCY MEDICINE | Admitting: EMERGENCY MEDICINE
Payer: MEDICARE

## 2018-04-24 VITALS
OXYGEN SATURATION: 99 % | DIASTOLIC BLOOD PRESSURE: 91 MMHG | SYSTOLIC BLOOD PRESSURE: 145 MMHG | WEIGHT: 169.98 LBS | RESPIRATION RATE: 16 BRPM | HEART RATE: 78 BPM | TEMPERATURE: 98 F

## 2018-04-24 DIAGNOSIS — Z79.899 OTHER LONG TERM (CURRENT) DRUG THERAPY: ICD-10-CM

## 2018-04-24 DIAGNOSIS — Z79.2 LONG TERM (CURRENT) USE OF ANTIBIOTICS: ICD-10-CM

## 2018-04-24 DIAGNOSIS — I10 ESSENTIAL (PRIMARY) HYPERTENSION: ICD-10-CM

## 2018-04-24 DIAGNOSIS — I25.10 ATHEROSCLEROTIC HEART DISEASE OF NATIVE CORONARY ARTERY WITHOUT ANGINA PECTORIS: ICD-10-CM

## 2018-04-24 DIAGNOSIS — Z79.82 LONG TERM (CURRENT) USE OF ASPIRIN: ICD-10-CM

## 2018-04-24 DIAGNOSIS — Z95.5 PRESENCE OF CORONARY ANGIOPLASTY IMPLANT AND GRAFT: Chronic | ICD-10-CM

## 2018-04-24 DIAGNOSIS — R56.9 UNSPECIFIED CONVULSIONS: ICD-10-CM

## 2018-04-24 PROCEDURE — 99284 EMERGENCY DEPT VISIT MOD MDM: CPT | Mod: 25

## 2018-04-24 PROCEDURE — 93010 ELECTROCARDIOGRAM REPORT: CPT

## 2018-04-24 NOTE — ED ADULT NURSE NOTE - CHPI ED SYMPTOMS NEG
no fever/no dizziness/no loss of consciousness/no weakness/no nausea/no blurred vision/no numbness/no vomiting

## 2018-04-24 NOTE — ED ADULT NURSE NOTE - OBJECTIVE STATEMENT
Pt BIBA with c/o "possible seizure. Neighbors stated that they saw him laying in the rain shaking." Pt denied seizure but is oriented x 2, self and place. Does not remember being out in the rain. Denies alcohol intake and drug use. Pt noted with staples to wound on top of head, pre hospital

## 2018-04-24 NOTE — ED ADULT NURSE NOTE - EENT WDL
Eyes with no visual disturbances.  Ears clean and dry and no hearing difficulties. Nose with pink mucosa and no drainage.

## 2018-04-25 VITALS
SYSTOLIC BLOOD PRESSURE: 116 MMHG | OXYGEN SATURATION: 98 % | TEMPERATURE: 98 F | DIASTOLIC BLOOD PRESSURE: 76 MMHG | HEART RATE: 89 BPM | RESPIRATION RATE: 17 BRPM

## 2018-04-25 LAB
ALBUMIN SERPL ELPH-MCNC: 4.6 G/DL — SIGNIFICANT CHANGE UP (ref 3.3–5)
ALP SERPL-CCNC: 72 U/L — SIGNIFICANT CHANGE UP (ref 40–120)
ALT FLD-CCNC: 11 U/L — SIGNIFICANT CHANGE UP (ref 10–45)
ANION GAP SERPL CALC-SCNC: 14 MMOL/L — SIGNIFICANT CHANGE UP (ref 5–17)
APPEARANCE UR: CLEAR — SIGNIFICANT CHANGE UP
AST SERPL-CCNC: 17 U/L — SIGNIFICANT CHANGE UP (ref 10–40)
BILIRUB SERPL-MCNC: 0.5 MG/DL — SIGNIFICANT CHANGE UP (ref 0.2–1.2)
BILIRUB UR-MCNC: NEGATIVE — SIGNIFICANT CHANGE UP
BUN SERPL-MCNC: 11 MG/DL — SIGNIFICANT CHANGE UP (ref 7–23)
CALCIUM SERPL-MCNC: 9.1 MG/DL — SIGNIFICANT CHANGE UP (ref 8.4–10.5)
CHLORIDE SERPL-SCNC: 89 MMOL/L — LOW (ref 96–108)
CO2 SERPL-SCNC: 28 MMOL/L — SIGNIFICANT CHANGE UP (ref 22–31)
COLOR SPEC: YELLOW — SIGNIFICANT CHANGE UP
CREAT SERPL-MCNC: 1.12 MG/DL — SIGNIFICANT CHANGE UP (ref 0.5–1.3)
DIFF PNL FLD: NEGATIVE — SIGNIFICANT CHANGE UP
GLUCOSE SERPL-MCNC: 98 MG/DL — SIGNIFICANT CHANGE UP (ref 70–99)
GLUCOSE UR QL: NEGATIVE — SIGNIFICANT CHANGE UP
HCT VFR BLD CALC: 33.8 % — LOW (ref 39–50)
HGB BLD-MCNC: 12.2 G/DL — LOW (ref 13–17)
KETONES UR-MCNC: NEGATIVE — SIGNIFICANT CHANGE UP
LEUKOCYTE ESTERASE UR-ACNC: NEGATIVE — SIGNIFICANT CHANGE UP
MCHC RBC-ENTMCNC: 32.4 PG — SIGNIFICANT CHANGE UP (ref 27–34)
MCHC RBC-ENTMCNC: 36.1 G/DL — HIGH (ref 32–36)
MCV RBC AUTO: 89.9 FL — SIGNIFICANT CHANGE UP (ref 80–100)
NITRITE UR-MCNC: NEGATIVE — SIGNIFICANT CHANGE UP
PCP SPEC-MCNC: SIGNIFICANT CHANGE UP
PH UR: 5.5 — SIGNIFICANT CHANGE UP (ref 5–8)
PLATELET # BLD AUTO: 182 K/UL — SIGNIFICANT CHANGE UP (ref 150–400)
POTASSIUM SERPL-MCNC: 3.6 MMOL/L — SIGNIFICANT CHANGE UP (ref 3.5–5.3)
POTASSIUM SERPL-SCNC: 3.6 MMOL/L — SIGNIFICANT CHANGE UP (ref 3.5–5.3)
PROT SERPL-MCNC: 7.1 G/DL — SIGNIFICANT CHANGE UP (ref 6–8.3)
PROT UR-MCNC: NEGATIVE MG/DL — SIGNIFICANT CHANGE UP
RBC # BLD: 3.76 M/UL — LOW (ref 4.2–5.8)
RBC # FLD: 13.7 % — SIGNIFICANT CHANGE UP (ref 10.3–16.9)
SODIUM SERPL-SCNC: 131 MMOL/L — LOW (ref 135–145)
SP GR SPEC: <=1.005 — SIGNIFICANT CHANGE UP (ref 1–1.03)
UROBILINOGEN FLD QL: 0.2 E.U./DL — SIGNIFICANT CHANGE UP
WBC # BLD: 5.3 K/UL — SIGNIFICANT CHANGE UP (ref 3.8–10.5)
WBC # FLD AUTO: 5.3 K/UL — SIGNIFICANT CHANGE UP (ref 3.8–10.5)

## 2018-04-25 PROCEDURE — 80307 DRUG TEST PRSMV CHEM ANLYZR: CPT

## 2018-04-25 PROCEDURE — 80053 COMPREHEN METABOLIC PANEL: CPT

## 2018-04-25 PROCEDURE — 70450 CT HEAD/BRAIN W/O DYE: CPT

## 2018-04-25 PROCEDURE — 93005 ELECTROCARDIOGRAM TRACING: CPT

## 2018-04-25 PROCEDURE — 82962 GLUCOSE BLOOD TEST: CPT

## 2018-04-25 PROCEDURE — 99284 EMERGENCY DEPT VISIT MOD MDM: CPT | Mod: 25

## 2018-04-25 PROCEDURE — 85027 COMPLETE CBC AUTOMATED: CPT

## 2018-04-25 PROCEDURE — 70450 CT HEAD/BRAIN W/O DYE: CPT | Mod: 26

## 2018-04-25 PROCEDURE — 36415 COLL VENOUS BLD VENIPUNCTURE: CPT

## 2018-04-25 PROCEDURE — 81003 URINALYSIS AUTO W/O SCOPE: CPT

## 2018-04-25 RX ORDER — LEVETIRACETAM 250 MG/1
1 TABLET, FILM COATED ORAL
Qty: 28 | Refills: 0
Start: 2018-04-25 | End: 2018-05-08

## 2018-04-25 RX ORDER — LEVETIRACETAM 250 MG/1
500 TABLET, FILM COATED ORAL ONCE
Qty: 0 | Refills: 0 | Status: COMPLETED | OUTPATIENT
Start: 2018-04-25 | End: 2018-04-25

## 2018-04-25 RX ADMIN — LEVETIRACETAM 500 MILLIGRAM(S): 250 TABLET, FILM COATED ORAL at 01:54

## 2018-04-25 NOTE — ED PROVIDER NOTE - OBJECTIVE STATEMENT
62M with hx of CAD, CHF, cocaine abuse, GERD, htn coming in with witnessed seizure, post-ictal state, pt denies alcohol abuse denies hx of drug abuse/cocaine abuse although written in previous notes from chart. No chest pain, no sob, pt states he does not recall incident was here in ER 4 days ago for similar episode, discharged against medical advice. Pt does not want to be admitted at this time, states he feels fine. States he wants to go home.

## 2018-04-25 NOTE — ED ADULT NURSE REASSESSMENT NOTE - NS ED NURSE REASSESS COMMENT FT1
Pt resting without distress. No seizure activity noted. Pt medicated as ordered. Pt more lucid at this time. Safety precautions in place, close  monitoring continues

## 2018-04-25 NOTE — ED PROVIDER NOTE - PHYSICAL EXAMINATION
gen: comfortable, conversant, sleeping but easily arousable to verbal stimulus  HEENT: oropharynx clear  Neck: supple, no meningismus,   CV: rrr no m/r/g  Resp: ctab,   Abd: nontender, no rebound/guarding

## 2018-04-25 NOTE — ED PROVIDER NOTE - MEDICAL DECISION MAKING DETAILS
62M with concern for seizure as per bystanders, brought in by EMS here 4 days ago for same complaint, left AMA. Pt continues to decline admission, head CT negative, labs overall unremarkable, neuro exam wnl, back to baseline, vital signs wnl. Pt is alert and oriented x 4, understands risks of recurrent seizure, given Keppra, agrees to take rx and will f/u as out pt with neurology, discussed with Dr. Lopez. Doubt electrolyte abnl, pt denies tox, denies alcohol abuse or other issues, unclear cause of seizure, will send Utox, will give Keppra and encourage f/u with neuro.

## 2018-04-25 NOTE — ED PROVIDER NOTE - PROGRESS NOTE DETAILS
Pt denies cp, sob. Understands he had two seizures in 4 days, denies hx of alcohol w/d, no tremor no tachycardia. Pt declines admission at this time despite lengthy discussion, agrees to f/u with neurology. Discussed with Dr. Lopez pt can be discharged on Keppra 500 mg BID and can f/u with neurology. Pt declines admission at this time for seizure w/u.

## 2018-05-08 ENCOUNTER — APPOINTMENT (OUTPATIENT)
Dept: NEUROLOGY | Facility: CLINIC | Age: 63
End: 2018-05-08

## 2018-08-03 NOTE — ED ADULT NURSE NOTE - NSHISCREENINGQ1_ED_A_ED
Subjective   Robert Fernandez is a 21 y.o. male.     History of Present Illness     He does have chronic diarrhea  immodium does not help as much but the Lomotil helped a little and they would like to try this if nothing else    He is here for his immunizations  Needs MMR and Hep A as well    The following portions of the patient's history were reviewed and updated as appropriate: allergies, current medications, past family history, past medical history, past social history, past surgical history and problem list.    Review of Systems   Constitutional: Negative.        Objective   Physical Exam   Constitutional: He appears well-developed.   Cardiovascular: Normal rate.    Pulmonary/Chest: Effort normal and breath sounds normal.   Nursing note and vitals reviewed.      Assessment/Plan   Robert was seen today for immunizations.    Diagnoses and all orders for this visit:    Immunization due    Functional diarrhea  -     diphenoxylate-atropine (LOMOTIL) 2.5-0.025 MG per tablet; 1-2 with each loose stool, max of 8 in 24 hours  -     rifaximin (XIFAXAN) 550 MG tablet; Take 1 tablet by mouth 3 (Three) Times a Day.    Active autistic disorder  -     CBC & Differential  -     Comprehensive Metabolic Panel  -     Valproic Acid Level, Total  -     Vitamin D 25 Hydroxy    Other orders  -     Hepatitis A Vaccine Adult IM  -     Meningococcal Conjugate Vaccine 4-Valent IM      Will try xifaxin for 2 weeks for chronic diarrhea, if not covered would use lomotil PRN.  Samples and then script given  Immunizations updated today        No

## 2018-10-30 NOTE — ED ADULT NURSE NOTE - INTEGUMENTARY WDL
Cancer Center Progress Note    Patient Name: Luz Kelley   YOB: 1963   Medical Record Number: [id number]   CSN: [id number]   Attending Physician: Mike Alvarez M.D.    Referring Physician: Segundo Lao MD      Date of Visit: 10/26/2018 aspirate, and peripheral blood smear:   -Chronic lymphocytic leukemia extensively involving a cellular bone marrow aspirate and suboptimal bone marrow core biopsy. See comment.     Electronically signed by Lewis Chu MD on 10/19/2018 at 9116      Final Ancillary Studies   Bone Marrow Microscopic Description:  CBC:  A CBC was performed on 10/12/18.     WBC (103/ul) 206.6   RBC (106/ul) 4.26   HGB (gm/dl) 12.6   HCT (%) 40.1   PLATELET (759/ER) 151.8   MCV (fL) 94.1   MCH (pg) 29.6   MCHC (gm/dL) 31.4 Megakaryocyte:  CELLULARITY:  Relatively decreased in the aspirate. MORPHOLOGY:  No overt dyspoietic changes are seen. Lymphocytes:  Lymphocytes are small and mature-appearing. Lymphocytes comprise the majority of aspirate cellularity.    Plasma Cell on formalin-fixed, paraffin-embedded tissue sections. All staining processes are validated by [facility] Department of Pathology to document appropriate staining reactions. Positive controls are utilized.       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 FISH analysis with the remaining probes showed normal results.      Deletion 11q22 (JANAY) is an unf Chromosome Results:   46,XY,del(11)(q13q23)[7]/46,sl,del(6)(q12q22)[4]   /46,sdl1,add(X)(p11.2),del(1)(q25),add(3)(q21),del(13)(q12q22)[3]/4   6,XY[6]   -----------------------------------------------------------   Diagnostic Impression:    Three related Bone Marrow  See Note    Comment: Access [email or web address] Enhanced Report using either link below:       -Direct access: [email or web address] [employer]([email or web address]       -Enter Username, Password: [email or web address]    Username: [email or web address]    Password: [email or web address] examination ordered as part of a routine general medical examination    • Type II or unspecified type diabetes mellitus without mention of complication, not stated as uncontrolled    • Unspecified essential hypertension    • Visual impairment     glasses Concern: Not Asked        Stress Concern: Not Asked        Weight Concern: Not Asked        Special Diet: Not Asked        Back Care: Not Asked        Exercise: No        Bike Helmet: Not Asked        Seat Belt: Not Asked        Self-Exams: Not Asked    So Nonreactive   Nonreactive   KENDALL (IgG) Unknown Negative   KENDALL (C3D) Unknown Negative   Hbsag Screen Index Unknown <0.10        Ref.  Range 10/12/2018 13:07   Glucose Latest Ref Range: 70 - 99 mg/dL 107 (H)   Sodium Latest Ref Range: 136 - 144 mmol/L 139   Po Latest Ref Range: 1.30 - 6.70 x10 (3) uL 2.63   Neutrophils Absolute Latest Ref Range: 1.30 - 6.70 x10(3) uL 2.63   Lymphocytes Absolute Latest Ref Range: 0.90 - 4.00 x10(3) uL 196.13 (H)   Monocytes Absolute Latest Ref Range: 0.10 - 1.00 x10(3) uL 7.07 (H homologies between 97% to 97.9% are also flagged as   borderline and may have an intermediate clinical course (Rachna QUIROZ et al., [address] :[phone], 2008).      Limitations:   CLL clones as low as 50% of total B cells can be analyz involves adenopathy. For example, conglomerate   of left axillary nodes measures 2.5 x 3.4 cm with a maximum SUV of 2.4. A 1.3 x 1.6 cm right axillary node demonstrates a maximum SUV of 2.2.   Multiple nodes have increased in size within the abdomen and p therapy selection would depend on presence of del(17p) or TP53. He does not have either one and is young.  Typically would recommend treatment with FCR but with IGHV mutation negative which usually is associated with shorter OS and higher risk of relapse fo Color consistent with ethnicity/race, warm, dry intact, resilient.

## 2019-01-28 NOTE — ED ADULT TRIAGE NOTE - RESPIRATORY RATE (BREATHS/MIN)
R hip pain after mechanical trip and fall.  ER and shortened.  VSS.  No ecchymosis or swelling to thigh.  NVI distally.  Likely hip fx, less likely dislocation.  Needs pain Rx, preop labs, imaging.  No need for neuro imaging as no head trauma and not on AC.  Will c/t reassess.  --BMM 16

## 2019-06-18 ENCOUNTER — EMERGENCY (EMERGENCY)
Facility: HOSPITAL | Age: 64
LOS: 1 days | Discharge: HOME | End: 2019-06-18
Admitting: EMERGENCY MEDICINE
Payer: MEDICARE

## 2019-06-18 VITALS
DIASTOLIC BLOOD PRESSURE: 96 MMHG | TEMPERATURE: 98 F | HEART RATE: 92 BPM | OXYGEN SATURATION: 100 % | SYSTOLIC BLOOD PRESSURE: 148 MMHG | RESPIRATION RATE: 18 BRPM

## 2019-06-18 DIAGNOSIS — Z95.5 PRESENCE OF CORONARY ANGIOPLASTY IMPLANT AND GRAFT: Chronic | ICD-10-CM

## 2019-06-18 PROBLEM — F14.10 COCAINE ABUSE, UNCOMPLICATED: Chronic | Status: ACTIVE | Noted: 2017-11-02

## 2019-06-18 PROCEDURE — 96372 THER/PROPH/DIAG INJ SC/IM: CPT

## 2019-06-18 PROCEDURE — 99283 EMERGENCY DEPT VISIT LOW MDM: CPT | Mod: 25

## 2019-06-18 PROCEDURE — 90715 TDAP VACCINE 7 YRS/> IM: CPT

## 2019-06-18 PROCEDURE — 73080 X-RAY EXAM OF ELBOW: CPT

## 2019-06-18 PROCEDURE — 99284 EMERGENCY DEPT VISIT MOD MDM: CPT

## 2019-06-18 PROCEDURE — 73080 X-RAY EXAM OF ELBOW: CPT | Mod: 26,LT

## 2019-06-18 PROCEDURE — 90471 IMMUNIZATION ADMIN: CPT

## 2019-06-18 RX ORDER — TETANUS TOXOID, REDUCED DIPHTHERIA TOXOID AND ACELLULAR PERTUSSIS VACCINE, ADSORBED 5; 2.5; 8; 8; 2.5 [IU]/.5ML; [IU]/.5ML; UG/.5ML; UG/.5ML; UG/.5ML
0.5 SUSPENSION INTRAMUSCULAR ONCE
Refills: 0 | Status: COMPLETED | OUTPATIENT
Start: 2019-06-18 | End: 2019-06-18

## 2019-06-18 RX ORDER — BACITRACIN ZINC 500 UNIT/G
1 OINTMENT IN PACKET (EA) TOPICAL ONCE
Refills: 0 | Status: COMPLETED | OUTPATIENT
Start: 2019-06-18 | End: 2019-06-18

## 2019-06-18 RX ORDER — DEXAMETHASONE 0.5 MG/5ML
10 ELIXIR ORAL ONCE
Refills: 0 | Status: COMPLETED | OUTPATIENT
Start: 2019-06-18 | End: 2019-06-18

## 2019-06-18 RX ORDER — ACETAMINOPHEN 500 MG
1000 TABLET ORAL ONCE
Refills: 0 | Status: COMPLETED | OUTPATIENT
Start: 2019-06-18 | End: 2019-06-18

## 2019-06-18 RX ADMIN — Medication 1000 MILLIGRAM(S): at 04:48

## 2019-06-18 RX ADMIN — TETANUS TOXOID, REDUCED DIPHTHERIA TOXOID AND ACELLULAR PERTUSSIS VACCINE, ADSORBED 0.5 MILLILITER(S): 5; 2.5; 8; 8; 2.5 SUSPENSION INTRAMUSCULAR at 05:44

## 2019-06-18 RX ADMIN — Medication 1 APPLICATION(S): at 05:44

## 2019-06-18 RX ADMIN — Medication 10 MILLIGRAM(S): at 04:48

## 2019-06-18 NOTE — ED ADULT NURSE NOTE - CHIEF COMPLAINT QUOTE
left elbow injury; pt reports he was seen at [facility] and they should have put a cast on his elbow but they did not and so he walked out

## 2019-06-18 NOTE — ED PROVIDER NOTE - OBJECTIVE STATEMENT
64 y/o male who is right hand dominant is present in the ED c/o left elbow pain x1 day. Pt states he slipped and fell yesterday on his left elbow. Pt reports constant pain without radiation with mild abrasion located on his elbow. He self-medicated with tylenol without improvement. Pt states he went to [place] and was upset "nothing" was done, therefore here in [facility]. Pt denies the following: fever, chills, neck back, numbness/tingling, swelling.

## 2019-06-18 NOTE — ED PROVIDER NOTE - MUSCULOSKELETAL, MLM
Spine appears normal, range of motion is not limited, no muscle or joint tenderness. GOOD ROM WITH SENSATION AND MOTOR FUNCTION INTACT WITH 2+ PULSE AND GOOD CAP REFILL

## 2019-06-18 NOTE — ED PROVIDER NOTE - CLINICAL SUMMARY MEDICAL DECISION MAKING FREE TEXT BOX
62 y/o male with left elbow pain s/p mechanical fall x1 day with mild abrasion. Treated at [facility] with negative findings. Here in the ED with no obvious deformity and neurovascular intact with neg xray for fx. Pt already with sling. Pain treated. Tdap updated. Advised PMD fu.

## 2019-06-18 NOTE — ED PROVIDER NOTE - NSFOLLOWUPINSTRUCTIONS_ED_ALL_ED_FT
Arm Pain    WHAT YOU NEED TO KNOW:    Your arm pain may be caused by a number of conditions. Examples include arthritis, nerve problems, or an awkward position while you sleep. X-rays did not show a broken bone in your arm or wrist. Arm pain may be a sign of a serious condition that needs immediate care, such as a heart attack.    DISCHARGE INSTRUCTIONS:    Call 911 for any of the following: You have any of the following signs of a heart attack:     Squeezing, pressure, or pain in your chest that lasts longer than 5 minutes or returns      Discomfort or pain in your back, neck, jaw, stomach, or arm       Trouble breathing or a fast, fluttery heartbeat      Nausea or vomiting      Lightheadedness or a sudden cold sweat, especially with chest pain or trouble breathing    Return to the emergency department if:     You have severe pain, or pain that spreads from your arm to other areas.      You have swelling, tingling, or numbness in your hand or fingers, or the skin turns blue.      You cannot move your arm.    Contact your healthcare provider if:     You have questions or concerns about your condition or care.        Medicines: You may need any of the following:     Prescription pain medicine may be given. Ask how to take this medicine safely.      NSAIDs, such as ibuprofen, help decrease swelling, pain, and fever. This medicine is available with or without a doctor's order. NSAIDs can cause stomach bleeding or kidney problems in certain people. If you take blood thinner medicine, always ask your healthcare provider if NSAIDs are safe for you. Always read the medicine label and follow directions.      Take your medicine as directed. Contact your healthcare provider if you think your medicine is not helping or if you have side effects. Tell him or her if you are allergic to any medicine. Keep a list of the medicines, vitamins, and herbs you take. Include the amounts, and when and why you take them. Bring the list or the pill bottles to follow-up visits. Carry your medicine list with you in case of an emergency.    Self-care:     Rest your arm as directed. A sling may be used to keep your arm from moving while it heals.      Apply ice as directed. Ice helps decrease pain and swelling. Ice may also help prevent tissue damage. Use an ice pack, or put crushed ice in a plastic bag. Cover it with a towel. Apply it to your arm for 20 minutes every few hours, or as directed. Ask how many times to apply ice each day, and for how many days.      Elevate your arm above the level of your heart as often as you can. This will help decrease swelling and pain. Prop your arm on pillows or blankets to keep the area elevated comfortably.      Adjust your position if you work in front of a computer. You may need arm or wrist supports or change the height of your chair.       Keep a pain record. Write down when your pain happens and how severe it is. Include any other symptoms you have with your pain. A record will help you keep track of pain cycles. Bring the record with you to your follow-up visits. It may also help your healthcare provider find out what is causing your pain.    Follow up with your healthcare provider as directed: You may need physical therapy. You may need to see an orthopedic specialist. Write down your questions so you remember to ask them during your visits.       © Copyright [employer] 2019 All illustrations and images included in CareNotes are the copyrighted property of [employer] or [employer].      back to top                      © Copyright [employer] 2019

## 2019-06-18 NOTE — ED ADULT TRIAGE NOTE - CHIEF COMPLAINT QUOTE
left elbow injury; pt reports he was seen at [facility] and they should have put a cast on his elbow but they did not and so he walked out
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
## 2019-06-22 DIAGNOSIS — Z23 ENCOUNTER FOR IMMUNIZATION: ICD-10-CM

## 2019-06-22 DIAGNOSIS — S50.312A ABRASION OF LEFT ELBOW, INITIAL ENCOUNTER: ICD-10-CM
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
## 2019-06-22 DIAGNOSIS — M25.522 PAIN IN LEFT ELBOW: ICD-10-CM
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
## 2020-08-19 NOTE — ED PROVIDER NOTE - CPE EDP SKIN NORM
Patient:   HECTOR GOMEZ            MRN: [id number]            FIN: [id number]              Age:   58 years     Sex:  MALE     :  66   Associated Diagnoses:   None   Author:   Laron Arroyo     Basic Information   Time seen: Date & time 20 10:09:00.   History source: Patient.   Arrival mode: Private vehicle, walking.   History limitation: None.   Additional information: Chief Complaint from Nursing Triage Note : Chief Complaint ED  20 10:08 CDT       Chief Complaint ED        Reports fever, headache x 5 days. Works as an RN at [employer]. COVID test pending  .     Review of Systems   Constitutional symptoms:  No fever, no chills.    Skin symptoms:  No rash,    Eye symptoms:  Negative except as documented in HPI.   ENMT symptoms:  Negative except as documented in HPI.   Respiratory symptoms:  No shortness of breath, no cough.    Cardiovascular symptoms:  No chest pain,    Gastrointestinal symptoms:  No abdominal pain, no nausea, no vomiting, no diarrhea, no constipation.   Genitourinary symptoms:  No dysuria,    Musculoskeletal symptoms:  No back pain,    Neurologic symptoms:  No headache, no dizziness.    Psychiatric symptoms:  Negative except as documented in HPI.   Endocrine symptoms:  Negative except as documented in HPI.   Hematologic/Lymphatic symptoms:  Negative except as documented in HPI.  Allergy/immunologic symptoms:  Negative except as documented in HPI.             Additional review of systems information: All other systems reviewed and otherwise negative.     Health Status   Allergies:    Allergic Reactions (All)  NKA.   Immunizations: Per nurse's notes.     Past Medical/ Family/ Social History   Medical history:    All Problems  Placement of stent in cardiac conduit / [id number] / Confirmed.   Surgical history:    No active procedure history items have been selected or recorded..  Family history: Include Family History   No family history items have been selected or recorded..    Social history:    Social & Psychosocial Habits  Alcohol  08/14/2020  Alcohol Abuse in Household No    Use: None, None  Tobacco  08/14/2020  Smoking Tobacco Use: Never smoker    Smoker in Household: No    Smoking Tobacco Use: Never smoker    Smoked/Smokeless Tobacco in Last 30 Days: No    Smoking Tobacco Use: Never smoker    Smokeless tobacco use: Never  .     Physical Examination              Vital Signs   ED Vital Sign   08/19/20 09:26 CDT Temperature - VS 37.5 deg_C  Normal    Temperature Source - VS Oral    Heart/Pulse Rate 91  HI    Pulse Source Monitor    Respiration Rate 18 breaths/min  HI    SpO2 98 %  Normal    NIBP Systolic 132  Normal    NIBP Diastolic 83  Normal    NIBP Source Right Arm    NIBP MAP 99  Normal   .   Height and Weight   08/19/20 09:26 CDT CLINICALWEIGHT 87 kg    Weight Method Measured   .              Oxygen saturation:  98 %.   O2 saturation reviewed and normal on room air.   General:  Alert, no acute distress.    Skin:  Warm, dry, intact.    Head:  Normocephalic, atraumatic.    Neck:  Supple, trachea midline.    Eye:  Pupils are equal, round and reactive to light, extraocular movements are intact, normal conjunctiva.   Ears, nose, mouth and throat:  Oral mucosa moist.   Cardiovascular:  Regular rate and rhythm, No murmur, Normal peripheral perfusion, No edema.   Respiratory:  Lungs are clear to auscultation, respirations are non-labored, breath sounds are equal.   Gastrointestinal:  Soft, Nontender, Non distended, Normal bowel sounds.   Back:  Nontender.   Musculoskeletal:  Normal ROM.   Neurological:  Alert and oriented to person, place, time, and situation, No focal neurological deficit observed, CN II-XII intact, normal sensory observed, normal motor observed.   Psychiatric:  Cooperative, appropriate mood & affect.      Medical Decision Making   Documents reviewed:  Emergency department nurses' notes.     Impression and Plan   Plan   Counseled: Patient, Regarding diagnosis,  Regarding diagnostic results, Regarding treatment plan, Patient indicated understanding of instructions.   Notes: Charting performed by ED Ligia Arroyo for Dr. Bhakta.  I have reviewed the scribe's charting and agree that the final signed note accurately reflects my personal performance of the history, physical exam, hospital course, and assessment and plan.   normal...

## 2021-06-05 NOTE — ED ADULT TRIAGE NOTE - NS ED NURSE AMBULANCES
Department of Neurosurgery  Progress Note    CHIEF COMPLAINT: s/p C4-C7 ACDF 6/2    SUBJECTIVE:  RRT this AM. Pt coded at CT. Intubated and transferred to ICU.      REVIEW OF SYSTEMS :  Intubated    OBJECTIVE:   VITALS:  /86   Pulse 106   Temp 97.8 °F (36.6 °C) (Temporal)   Resp 20   Ht 5' 5\" (1.651 m)   Wt 183 lb (83 kg)   SpO2 94%   BMI 30.45 kg/m²     PHYSICAL:  Neurologic:  Intubated, opens eyes to voice  Motor Exam: Following commands in all extremities   Incision c/d/i  Collar intact     DATA:  CBC:   Lab Results   Component Value Date    WBC 19.9 06/05/2021    RBC 5.22 06/05/2021    HGB 12.1 06/05/2021    HCT 37.7 06/05/2021    MCV 72.2 06/05/2021    MCH 23.2 06/05/2021    MCHC 32.1 06/05/2021    RDW 16.3 06/05/2021     06/05/2021    MPV 10.8 06/05/2021     BMP:    Lab Results   Component Value Date     06/05/2021    K 4.3 06/05/2021    K 4.3 05/26/2021     06/05/2021    CO2 23 06/05/2021    BUN 16 06/05/2021    LABALBU 3.9 06/05/2021    CREATININE 0.8 06/05/2021    CALCIUM 10.1 06/05/2021    GFRAA >60 06/05/2021    LABGLOM >60 06/05/2021    GLUCOSE 128 06/05/2021     PT/INR:    Lab Results   Component Value Date    PROTIME 10.8 05/26/2021    INR 1.0 05/26/2021     PTT:    Lab Results   Component Value Date    APTT 32.9 08/07/2018   [APTT}    Current Inpatient Medications  Current Facility-Administered Medications: methylPREDNISolone sodium (SOLU-MEDROL) 125 MG injection, , ,   ipratropium-albuterol (DUONEB) nebulizer solution 1 ampule, 1 ampule, Inhalation, Q4H WA  budesonide (PULMICORT) nebulizer suspension 250 mcg, 250 mcg, Nebulization, BID  Arformoterol Tartrate (BROVANA) nebulizer solution 15 mcg, 15 mcg, Nebulization, BID  fentaNYL (SUBLIMAZE) injection 25 mcg, 25 mcg, Intravenous, Once  fentaNYL (SUBLIMAZE) 100 MCG/2ML injection, , ,   midazolam (VERSED) 2 MG/2ML injection, , ,   fentaNYL 5 mcg/ml in 0.9%  ml infusion, 12.5-200 mcg/hr, Intravenous, Continuous  propofol injection, 5-50 mcg/kg/min, Intravenous, Titrated  0.9 % sodium chloride bolus, 1,000 mL, Intravenous, Once  vancomycin 1000 mg IVPB in 250 mL D5W addavial, 1,000 mg, Intravenous, Q12H  cefepime NS flush, , Intravenous, Q12H  cefepime (MAXIPIME) 1000 mg IVPB minibag, 1,000 mg, Intravenous, Q12H  enoxaparin (LOVENOX) injection 40 mg, 40 mg, Subcutaneous, Daily  dexamethasone (DECADRON) injection 4 mg, 4 mg, Intravenous, Q6H  0.9 % sodium chloride infusion, , Intravenous, Continuous  cyclobenzaprine (FLEXERIL) tablet 10 mg, 10 mg, Oral, TID PRN  pregabalin (LYRICA) capsule 225 mg, 225 mg, Oral, BID  sodium chloride flush 0.9 % injection 5-40 mL, 5-40 mL, Intravenous, 2 times per day  sodium chloride flush 0.9 % injection 5-40 mL, 5-40 mL, Intravenous, PRN  0.9 % sodium chloride infusion, 25 mL, Intravenous, PRN  ondansetron (ZOFRAN-ODT) disintegrating tablet 4 mg, 4 mg, Oral, Q8H PRN **OR** ondansetron (ZOFRAN) injection 4 mg, 4 mg, Intravenous, Q6H PRN  oxyCODONE (ROXICODONE) immediate release tablet 5 mg, 5 mg, Oral, Q4H PRN **OR** oxyCODONE HCl (OXY-IR) immediate release tablet 10 mg, 10 mg, Oral, Q4H PRN  morphine (PF) injection 2 mg, 2 mg, Intravenous, Q2H PRN **OR** morphine sulfate (PF) injection 4 mg, 4 mg, Intravenous, Q2H PRN  polyethylene glycol (GLYCOLAX) packet 17 g, 17 g, Oral, Daily  bisacodyl (DULCOLAX) EC tablet 5 mg, 5 mg, Oral, Daily  sennosides-docusate sodium (SENOKOT-S) 8.6-50 MG tablet 1 tablet, 1 tablet, Oral, BID  benzocaine-menthol (CEPACOL SORE THROAT) lozenge 1 lozenge, 1 lozenge, Oral, Q2H PRN  acetaminophen (TYLENOL) tablet 650 mg, 650 mg, Oral, Q4H PRN  insulin lispro (HUMALOG) injection vial 0-12 Units, 0-12 Units, Subcutaneous, TID WC  insulin lispro (HUMALOG) injection vial 0-6 Units, 0-6 Units, Subcutaneous, Nightly  glucose (GLUTOSE) 40 % oral gel 15 g, 15 g, Oral, PRN  dextrose 50 % IV solution, 12.5 g, Intravenous, PRN  glucagon (rDNA) injection 1 mg, 1 mg, Intramuscular, PRN  dextrose 5 % solution, 100 mL/hr, Intravenous, PRN  diphenhydrAMINE (BENADRYL) injection 25 mg, 25 mg, Intravenous, Q6H PRN    ASSESSMENT:   s/p C4-C7 ACDF 6/2  RRT 6/5, possible aspiration pneumonia     PLAN:  -Continue ICU care  -Custom collar x3 months  -Follow up in neurosurgery in 4 weeks with x-rays      Electronically signed by Julianna Galan PA-C on 6/5/2021 at 11:46 AM    I have interviewed and examined the patient and agree. Respiratory distress and pneumonia. Will keep intubated for now.   Abx and will try to wean vent    Carol Arreaga MD [facility]

## 2022-09-12 NOTE — ED ADULT TRIAGE NOTE - SPO2 (%)
Pt returned to room 22. Pt can be in phase 2 per CRNA. Pt awake and talking. Denies any pain. VSS.   98

## 2023-02-17 NOTE — ED ADULT TRIAGE NOTE - NS ED TRIAGE AVPU SCALE
Subjective:  Emily is a pleasant 77 year old female who is seen today for follow-up of her bilateral hip greater trochanteric bursitis.  She had corticosteroid injection both hips in October of 2022 and had only about a month or so of relief.  The pain is slowly worsened and it is painful for her to go up and down stairs particularly with her right leg.  She has pain in the lateral aspect of both her hips when she sleeps at night.  She denies any groin pain.  She denies any radiation of pain.  She has had no paresthesias or dysesthesias.  She notes the pain is better with rest and with sitting but when she flexes her hip or lays on her hip she has increased pain.  The right is somewhat worse than the left but both are painful again.    Objective:  Emily is a pleasant 77-year-old female who is alert and oriented x3.  She stands and walks with a smooth nonantalgic gait.  She has excellent strength in resisted flexion of both hips.  She has excellent strength on abduction adduction or hips without significant pain.  She has pain to palpation of the ITB band just over the greater trochanter of both hips.  She has pain to palpation of the greater trochanteric bursa of both hips.  She has slight reduction and internal rotation with both hips but no groin or leg pain with this.  She has good movement, sensation and circulation in both lower extremities.    I have reviewed the patient's medications and allergies, past medical, surgical, social and family history, updating these as appropriate. See Histories section of the EMR for a display of this information.    Imaging:  No new imaging.    Assessment:  Bilateral greater trochanteric bursitis.    Plan:  The plan is to have her undergo physical therapy to work on strengthening and stability exercises for her hips.  They can give her home exercise program that she can do at the [facility].  They can try modalities such as dry needling ultrasound as warranted to help with the  bursitis.  If necessary we can always consider corticosteroid injection in both hips to allow her to do more strengthening.  She understands this and is agreeable to this plan.    He Foster MD serves as my supervising physician and was available for questions regarding formulation of diagnosis and plan and review of patient care. If Dr. Foster is unavailable, an alternate supervising physician will cover his responsibilities based on established yearly written agreement and daily availability of said physicians.     Alert-The patient is alert, awake and responds to voice. The patient is oriented to time, place, and person. The triage nurse is able to obtain subjective information.

## 2023-02-28 NOTE — ED ADULT NURSE NOTE - NS ED NURSE RECORD ANOTHER HT AND WT
TELEMEDICINE VIRTUAL VISIT  CHIEF COMPLAINT  Conjunctivitis      HPI    Patient presents with mother (historian) for evaluation of left eye redness that began 1 day ago.  Associated symptoms include:  ocular pruritis, discharge from the left eye.  Home therapy tried prior to visit:  zyrtec x 1 yesterday without much relief, as well as OTC Multi-symptom cold relief with minimal relief of cold symptoms.  She has had some cold symptoms (rhinorrhea, congestion, sneezing) for over a week.  The right eye now looks slightly red as well.    REVIEW OF SYSTEMS  Pertinent positives per HPI.      PHYSICAL EXAM  CONSTITUTIONAL: No apparent distress. Does not appear acutely ill or septic. Appears adequately hydrated.  HEENT: EOMI, + conjunctival injection L> R  PULM: Breathing unlabored.  PSYCHIATRIC: Alert, conversant and grossly oriented. Mood is grossly neutral. Affect appropriate.     ASSESSMENT AND PLAN  1. Acute conjunctivitis of left eye, unspecified acute conjunctivitis type          Medications Ordered This Encounter   Medications    bacitracin-polymyxin b (POLYSPORIN) ophthalmic ointment     Sig: Place 0.5 inches into both eyes 2 (two) times daily. Place a 1/2 inch ribbon of ointment into the lower eyelid. for 7 days     Dispense:  3.5 g     Refill:  0      Symptomatic care discussed.  Handout per AVS.  School excuse provided.    FOLLOW-UP  Follow up if symptoms worsen or fail to improve.     Visit Details: This visit was a telemedicine virtual visit with synchronous audio and video. Ciera reported that her location at the time of this visit was at home, in the state [facility]. Ciera chose and consented to receive these medical services by telemedicine.      
Yes, record another set of Body Measurements

## 2023-05-28 NOTE — ED PROVIDER NOTE - OBJECTIVE STATEMENT
ASSESSMENT/PLAN:    I have reviewed the nursing notes.  I have reviewed the findings, diagnosis, plan and need for follow up with the patient.    1. Urinary problem  2. Complicated UTI  3CKD stage 4 with GFR 15-29   - UA Macroscopic with reflex to Microscopic and Culture  - Urine Culture  - Comprehensive Metabolic Panel  - CBC and Differential  Normal CBC. CMP shows GFR of 26, creatinine at 2.44.  I feel at this time doxycycline is the safest antibiotic choice for Gaudencio to avoid any worsening renal disease or acute kidney injury as he has stage IV chronic kidney disease at this time.  Reviewed recommendations and it appears no renal dose adjustments are indicated. Doxycycline sent to the pharmacy for complicated UTI twice daily for 7 days.  Urinalysis today along with symptoms consistent with UTI -  with pyuria, large leukocytes and hematuria.  No evidence of systemic infection at this time and is vitally stable.  We will watch culture results and treat accordingly and inform patient about 2 days if any changes to treatment plan are indicated.  Patient and wife verbalized understanding the above recommendations and treatment plan.  - doxycycline hyclate (VIBRAMYCIN) 100 MG capsule; Take 1 capsule (100 mg) by mouth 2 times daily for 7 days  Dispense: 14 capsule; Refill: 0    Discussed warning signs/symptoms indicative of need to f/u    Follow up if symptoms persist or worsen or concerns    I explained my diagnostic considerations and recommendations to the patient, who voiced understanding and agreement with the treatment plan. All questions were answered. We discussed potential side effects of any prescribed or recommended therapies, as well as expectations for response to treatments.    Jeaneth Bobo NP  5/28/2023  1:13 PM    HPI:  Gaudencio Jorge is a 83 year old male  who presents to [facility] today for concerns of urinary symptoms. Hx of bladder infections secondary to prostate radiation. Burning with  "urination and urgency. Symptoms started about 7-10 days ago. Urgency lasted a couple days and then improved. The urine is cloudy. The burning is ongoing. Burning started also about a week ago. Having some urinary frequency. Pushes fluids so goes often anyway. No fevers/chills, nausea/vomiting, diarrhea, or acute flank pain.     [facility]     Stage 4 renal disease. Just had labs but uncertain on [facility]. He was on dialysis at one time in the past. His kidneys shut down after that and was on dialysis for 2 months or so and then was able to stop due to improved kidney function.     Last A1C was \"good\" unknown value at this time.  His wife tells me that he was recently started on Jardiance about 2 months ago.  He was told of course that urinary tract infections are common following initiation of this medication.  Prior to this he has not had a UTI for about 4 years since 2019 per our chart review.  Patient confirms he has not had a UTI that he was seen elsewhere for since the one here.    Wife is here with him today.     Past Medical History:   Diagnosis Date     Chronic kidney disease, stage III (moderate) (H) 8/16/2011     Essential hypertension 3/8/2018     GE reflux 8/10/2010     Hyperlipidemia 3/8/2018     Ischemic heart disease 3/22/2004    Overview:  status post acute myocardial infarction with PTCA and stent placement     Prostate cancer (H) 7/27/2017     Past Surgical History:   Procedure Laterality Date     ANGIOPLASTY      03/22/04,Acute coronary syndrome, hospitalized at [facility] after transfer from [facility].  Cardiac catheterization with angioplasty to right coronary artery with stent placement.  Normal left ventricular systolic performance with an EF of 60%.     ARTHROPLASTY KNEE      07/09,Left total knee arthroplasty     COLONOSCOPY      12/07,Screening colonoscopy, follow-up recommended in 10 years     DENTAL SURGERY      12/04,Dental extractions     " 60 y/o M with history of CAD with STEMI in 11/2015 s/p PCI with 2 bare metal stents placed to proximal LAD (cath 2016: 3VD, 50% oD1,  OM2,  RCA, patent LAD stents, EF 20%, EDP 25) w/ CHF (EF 20%), s/p aicd,  PUD, htn, hld, cocaine abuse, tranaminitis, seen prev for rhabdo, ams in setting of drug use, mult ed visits w weakness, ams biba for ams, weakness, hypotension OTHER SURGICAL HISTORY      ,HERNIA REPAIR,Status post left herniorrhaphy     OTHER SURGICAL HISTORY      [id number],PREMALIG/BENIGN SKIN LESION EXCISION,Skin lesion removals     OTHER SURGICAL HISTORY      04,,PTCA,Stenting of the first diagonal branch for an acute coronary syndrome, [facility] in [place], Dr. Chantelle Zapata.     Social History     Tobacco Use     Smoking status: Former     Types: Cigarettes     Quit date: 2002     Years since quittin.4     Passive exposure: Never     Smokeless tobacco: Former   Vaping Use     Vaping status: Never Used   Substance Use Topics     Alcohol use: Yes     Alcohol/week: 0.8 standard drinks of alcohol     Current Outpatient Medications   Medication Sig Dispense Refill     amLODIPine (NORVASC) 5 MG tablet Take 5 mg by mouth       aspirin (ASA) 81 MG tablet Take 1 tablet (81 mg) by mouth daily       carvedilol (COREG) 3.125 MG tablet Take 3.125 mg by mouth 2 times daily (with meals)       doxycycline hyclate (VIBRAMYCIN) 100 MG capsule Take 1 capsule (100 mg) by mouth 2 times daily for 7 days 14 capsule 0     empagliflozin (JARDIANCE) 10 MG TABS tablet Take 10 mg by mouth daily       ferrous gluconate (FERGON) 324 (38 Fe) MG tablet Take 324 mg by mouth       losartan (COZAAR) 50 MG tablet Take 50 mg by mouth daily       magnesium 200 MG TABS Take 250 mg by mouth       Multiple Vitamins-Minerals (PRESERVISION AREDS PO)        nitroGLYcerin (NITROSTAT) 0.4 MG sublingual tablet Place 0.4 mg under the tongue every 5 minutes as needed       omeprazole (PRILOSEC) 20 MG CR capsule Take 20 mg by mouth daily       rosuvastatin (CRESTOR) 40 MG tablet Take 40 mg by mouth daily       tamsulosin (FLOMAX) 0.4 MG capsule Take 0.4 mg by mouth       torsemide (DEMADEX) 20 MG tablet Take 20 mg by mouth       triamcinolone (KENALOG) 0.1 % external ointment APPLY OINTMENT TOPICALLY TO AFFECTED AREA TWICE DAILY TO THREE TIMES DAILY FOR 10 TO 14 DAYS, THEN AS NEEDED  "AFTER       Vitamin D3 (VITAMIN D, CHOLECALCIFEROL,) 25 mcg (1000 units) tablet Take by mouth daily       No Known Allergies  Past medical history, past surgical history, current medications and allergies reviewed and accurate to the best of my knowledge.      ROS:  Refer to HPI    /52 (BP Location: Right arm, Patient Position: Sitting, Cuff Size: Adult Regular)   Pulse 60   Temp 97.2  F (36.2  C) (Tympanic)   Resp 12   Ht 1.727 m (5' 8\")   Wt 80.7 kg (178 lb)   SpO2 98%   BMI 27.06 kg/m      EXAM:  General Appearance: nontoxic appearing 83 year old male, appropriate appearance for age. No acute distress   Respiratory: normal chest wall and respirations.  Normal effort.  Clear to auscultation bilaterally, no wheezing, crackles or rhonchi.  No increased work of breathing.  No cough appreciated.  Cardiac: RRR with no murmurs  Abdomen: soft, nontender, no rigidity, no rebound tenderness or guarding, normal bowel sounds present  :  No suprapubic tenderness to palpation.  Absent CVA tenderness to palpation.    Psychological: normal affect, alert, oriented, and pleasant.     Results for orders placed or performed in visit on 05/28/23   UA Macroscopic with reflex to Microscopic and Culture     Status: Abnormal    Specimen: Urine, Midstream   Result Value Ref Range    Color Urine Yellow Colorless, Straw, Light Yellow, Yellow    Appearance Urine Cloudy (A) Clear    Glucose Urine >1000 (A) Negative mg/dL    Bilirubin Urine Negative Negative    Ketones Urine Negative Negative mg/dL    Specific Gravity Urine 1.018 1.000 - 1.030    Blood Urine Small (A) Negative    pH Urine 6.0 5.0 - 9.0    Protein Albumin Urine 30 (A) Negative mg/dL    Urobilinogen Urine Normal Normal, 2.0 mg/dL    Nitrite Urine Negative Negative    Leukocyte Esterase Urine Large (A) Negative    WBC Clumps Urine Present (A) None Seen /HPF    RBC Urine 4 (H) <=2 /HPF    WBC Urine >182 (H) <=5 /HPF    Narrative    Urine Culture ordered based on " laboratory criteria   Comprehensive Metabolic Panel     Status: Abnormal   Result Value Ref Range    Sodium 139 136 - 145 mmol/L    Potassium 5.0 3.4 - 5.3 mmol/L    Chloride 101 98 - 107 mmol/L    Carbon Dioxide (CO2) 25 22 - 29 mmol/L    Anion Gap 13 7 - 15 mmol/L    Urea Nitrogen 44.7 (H) 8.0 - 23.0 mg/dL    Creatinine 2.44 (H) 0.67 - 1.17 mg/dL    Calcium 9.4 8.8 - 10.2 mg/dL    Glucose 222 (H) 70 - 99 mg/dL    Alkaline Phosphatase 83 40 - 129 U/L    AST 17 10 - 50 U/L    ALT 18 10 - 50 U/L    Protein Total 7.1 6.4 - 8.3 g/dL    Albumin 4.4 3.5 - 5.2 g/dL    Bilirubin Total 0.5 <=1.2 mg/dL    GFR Estimate 26 (L) >60 mL/min/1.73m2   Extra Tube     Status: None    Narrative    The following orders were created for panel order Extra Tube.  Procedure                               Abnormality         Status                     ---------                               -----------         ------                     Extra Red Top Tube[[id number]]                               Final result                 Please view results for these tests on the individual orders.   CBC with platelets and differential     Status: Abnormal   Result Value Ref Range    WBC Count 7.3 4.0 - 11.0 10e3/uL    RBC Count 4.13 (L) 4.40 - 5.90 10e6/uL    Hemoglobin 12.6 (L) 13.3 - 17.7 g/dL    Hematocrit 38.7 (L) 40.0 - 53.0 %    MCV 94 78 - 100 fL    MCH 30.5 26.5 - 33.0 pg    MCHC 32.6 31.5 - 36.5 g/dL    RDW 12.0 10.0 - 15.0 %    Platelet Count 244 150 - 450 10e3/uL    % Neutrophils 64 %    % Lymphocytes 24 %    % Monocytes 9 %    % Eosinophils 3 %    % Basophils 0 %    % Immature Granulocytes 0 %    NRBCs per 100 WBC 0 <1 /100    Absolute Neutrophils 4.7 1.6 - 8.3 10e3/uL    Absolute Lymphocytes 1.7 0.8 - 5.3 10e3/uL    Absolute Monocytes 0.6 0.0 - 1.3 10e3/uL    Absolute Eosinophils 0.2 0.0 - 0.7 10e3/uL    Absolute Basophils 0.0 0.0 - 0.2 10e3/uL    Absolute Immature Granulocytes 0.0 <=0.4 10e3/uL    Absolute NRBCs 0.0 10e3/uL   Extra Red Top  Tube     Status: None   Result Value Ref Range    Hold Specimen [facility]    CBC and Differential     Status: Abnormal    Narrative    The following orders were created for panel order CBC and Differential.  Procedure                               Abnormality         Status                     ---------                               -----------         ------                     CBC with platelets and d...[[id number]]  Abnormal            Final result                 Please view results for these tests on the individual orders.        62 y/o M with history of CAD with STEMI in 11/2015 s/p PCI with 2 bare metal stents placed to proximal LAD (cath 2016: 3VD, 50% oD1,  OM2,  RCA, patent LAD stents, EF 20%, EDP 25) w/ CHF (EF 20%), s/p aicd,  PUD, htn, hld, cocaine abuse, transaminitis, seen prev for rhabdo, ams in setting of drug use, mult ed visits w weakness, ams biba for ams, weakness, hypotension.  Pt reports he passed out, denies ha but notes some pain in nose and bleeding at nose, also c/o bilat hand pain 2/2 cuts on his hands  No cp, palpitations, + sob 2/2 bronchitis and recent cough as well as his chf.  No abd pain, n/v.  Pt c/o needing to have diarrheal stool now but denies at home.  No drugs, etoh.  Pt is a poor historian.

## 2023-12-11 NOTE — ED PROVIDER NOTE - NSTIMEPROVIDERCAREINITIATE_GEN_ER
Quality 110: Preventive Care And Screening: Influenza Immunization: Influenza Immunization Administered during Influenza season Detail Level: Detailed 20-Apr-2018 16:32

## 2024-07-02 NOTE — ED PROVIDER NOTE - PSH
PT Evaluation     Today's date: 2024  Patient name: Rolf Heard  : 1998  MRN: [id number]  Referring provider: Shira Evans CR*  Dx:   Encounter Diagnosis     ICD-10-CM    1. Chronic right shoulder pain  M25.511     G89.29       2. Impingement syndrome of right shoulder  M75.41           Start Time: 1705  Stop Time: 1745  Total time in clinic (min): 40 minutes    Assessment  Impairments: abnormal coordination, abnormal or restricted ROM, abnormal movement, activity intolerance, impaired physical strength, lacks appropriate home exercise program, pain with function, poor posture , poor body mechanics, unable to perform ADL, participation limitations and activity limitations  Symptom irritability: low    Assessment details: Pt is a 26 y.o male who presents to skilled physical therapy with complaints of chronic R shoulder pain for over 8 months. Pt demonstrated poor sitting posture, decreased R shoulder A/PROM, slightly decreased shoulder strength, TTP along proximal biceps and bicipital groove, and slightly altered scapular mechanics. Pt's hx, clinical findings, and special testing of the R shoulder corresponds to possible impingement syndrome and scapular dyskinesis of the R shoulder. Pt was educated on his diagnosis, prognosis, POC, and HEP. Pt's pain and deficits are preventing him from performing ADLs and recreational activities without compensations. Pt would benefit from skilled physical therapy to reduce his pain, address his deficits, progress towards his goals, and return to his PLOF.    Understanding of Dx/Px/POC: good     Prognosis: good    Goals  Short Term Goals:  1) Pt will initiate and progress his HEP in 2-3 weeks.  2) Pt will improve his shoulder AROM to WNL without pain or compensations in 2-3 weeks.  3) Pt will improve his shoulder MMT by 1 to improve his ADLs in 2-3 weeks.    Long Term Goals:  1) Pt will be able to reach overhead without pain to grab a plate in 4-6 weeks.  2) Pt  will be able to perform ADLs without shoulder pain in 4-6 weeks.  3) Pt will be able to workout for at least 30 mins with less than 2/10 pain in 4-6 weeks.     Plan  Patient would benefit from: skilled physical therapy and PT eval  Planned modality interventions: cryotherapy    Planned therapy interventions: activity modification, ADL retraining, joint mobilization, manual therapy, motor coordination training, body mechanics training, neuromuscular re-education, coordination, postural training, patient/caregiver education, strengthening, stretching, therapeutic activities, therapeutic exercise, flexibility, functional ROM exercises, graded exercise and home exercise program    Frequency: 2x week  Duration in weeks: 6  Plan of Care beginning date: 2024  Plan of Care expiration date: 2024  Treatment plan discussed with: patient        Subjective Evaluation    History of Present Illness  Mechanism of injury: Pt states about 8 months ago he was at the gym doing chest exercises and felt pain in the front of his R shoulder. He was having pain lifting and reaching up which was pretty significant. He has been trying to do stretches which have helped but it continues to persist. He had something similar happen a few years ago which went away eventually. R arm is his dominant arm. Pt initially rested, then began to work on dead hangs, and other shoulder strengthening exercises. He has been slowly getting better but gets his pain doing any chest exercises. Pt denies any numbness or tingling. Pt works as a , no problems. Initially sleeping was hard sleeping on his side, but now doesn't have any problems. No previous surgeries to his shoulder.   Patient Goals  Patient goals for therapy: decreased pain, increased motion, independence with ADLs/IADLs, return to sport/leisure activities and increased strength  Patient goal: working out without pain  Pain  Current pain ratin  At best pain ratin  At worst pain  ratin  Location: anterior shoulder, biceps tendon/groove  Quality: sharp, tight and pulling  Relieving factors: medications (stretches, exercises)  Aggravating factors: overhead activity and lifting (chest press)  Progression: improved    Social Support  Lives with: alone    Employment status: working  Hand dominance: right  Exercise history: working out at the gym          Objective     Static Posture     Head  Forward.    Shoulders  Rounded.    Postural Observations  Seated posture: fair  Standing posture: fair      Tenderness     Right Shoulder  Tenderness in the biceps tendon (proximal) and bicipital groove. No tenderness in the AC joint and acromion.     Active Range of Motion   Left Shoulder   Flexion: 165 (pulling) degrees   Abduction: 175 degrees   External rotation BTH: T4   Internal rotation BTB: T6     Right Shoulder   Flexion: 165 (pulling) degrees   Abduction: 165 degrees   External rotation BTH: T4 with pain  Internal rotation BTB: T6 with pain    Passive Range of Motion     Right Shoulder   Flexion: WFL  Abduction: WFL  External rotation 90°: WFL  Internal rotation 90°: 40 degrees with pain    Strength/Myotome Testing     Left Shoulder     Planes of Motion   Flexion: 5   Abduction: 4+   External rotation at 0°: 4   Internal rotation at 0°: 4+     Right Shoulder     Planes of Motion   Flexion: 4   Abduction: 4-   External rotation at 0°: 3+   Internal rotation at 0°: 4     Left Elbow   Flexion: 5  Extension: 5    Right Elbow   Flexion: 5  Extension: 5    Tests     Right Shoulder   Positive empty can, Hawkin's and painful arc.   Negative full can, horn blower, lift-off, Speed's and bicep load test positive.       Flowsheet Rows      Flowsheet Row Most Recent Value   PT/OT G-Codes    Current Score 81   Projected Score 83                  Insurance:  AMA/CMS Eval/ Re-eval Auth #/ Referral # Total units or visits Start date  Expiration date KX? Visit limitation?  PT only or  PT+OT? Co-Insurance   CMS  7/2/24 7/2/24                      POC Start Date POC Expiration Date Signed POC?   7/2/24 8/13/24 pending      Date               Visits/Units:  Used               Authed:  Remaining                      Precautions: None  HEP: Access Code [email or web address]    Date     7/2   Visit Number     1 (IE)   Manuals                                        Neuro Re-Ed                                                                 Ther Ex                                                                        Ther Activity                        Gait Training                        Modalities                                  No significant past surgical history

## 2025-08-07 NOTE — ED ADULT NURSE NOTE - PYSCHOSOCIAL ASSESSMENT
